# Patient Record
Sex: FEMALE | Race: WHITE | NOT HISPANIC OR LATINO | Employment: PART TIME | URBAN - METROPOLITAN AREA
[De-identification: names, ages, dates, MRNs, and addresses within clinical notes are randomized per-mention and may not be internally consistent; named-entity substitution may affect disease eponyms.]

---
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
## 2023-02-03 ENCOUNTER — TELEPHONE (OUTPATIENT)
Dept: OBSTETRICS AND GYNECOLOGY | Facility: CLINIC | Age: 18
End: 2023-02-03

## 2023-02-03 NOTE — TELEPHONE ENCOUNTER
Called pt back to verbalize to her to try sticking her patch back on and that if her period does not go off or gets heavier along w headaches or feeling light headed to let us know. Pt states she stuck patch on Wednesday and still on period. I expressed to pt that she should give her body time to realize patch is back on but if bleeding does not stop within thew next week to call and let us know. Pt verbalized understanding

## 2023-02-16 ENCOUNTER — TELEPHONE (OUTPATIENT)
Dept: OBSTETRICS AND GYNECOLOGY | Facility: CLINIC | Age: 18
End: 2023-02-16

## 2023-02-16 NOTE — TELEPHONE ENCOUNTER
----- Message from Zeina Juan sent at 2/10/2023  9:54 AM CST -----  Regarding: Patient Case  Contact: Pens[place]olanorm MartinOlman  See Phone Encounter from 2/3.   She is returning call to inform that she is still bleeding.   Ph[facility]acy: [facility] : [phone]

## 2023-02-16 NOTE — TELEPHONE ENCOUNTER
Contacted pt and asked her if she was stilling bleeding. Pt reports she stopped bleeding 2 days ago and is still using Xulane patches. Notified pt that If she would have any more irregular bleeding, SL would discuss with her at follow up appt on 03/08/2023. Pt verbalized understanding.

## 2023-02-27 ENCOUNTER — TELEPHONE (OUTPATIENT)
Dept: OBSTETRICS AND GYNECOLOGY | Facility: CLINIC | Age: 18
End: 2023-02-27
Payer: MEDICAID

## 2023-02-27 DIAGNOSIS — Z30.9 ENCOUNTER FOR CONTRACEPTIVE MANAGEMENT, UNSPECIFIED TYPE: Primary | ICD-10-CM

## 2023-02-27 RX ORDER — NORELGESTROMIN AND ETHINYL ESTRADIOL 150; 35 UG/D; UG/D
PATCH TRANSDERMAL
COMMUNITY
Start: 2023-02-06 | End: 2023-03-08 | Stop reason: SDUPTHER

## 2023-02-27 RX ORDER — LEVONORGESTREL/ETHINYL ESTRADIOL 2.6; 2.3 MG/1; MG/1
1 PATCH TRANSDERMAL
Qty: 3 PATCH | Refills: 0 | COMMUNITY
Start: 2023-02-27 | End: 2023-03-08 | Stop reason: SDDI

## 2023-02-27 NOTE — TELEPHONE ENCOUNTER
----- Message from Adelaida Frye sent at 2/24/2023 11:20 AM CST -----  Regarding: Med Refill  .Type:  RX Refill Request    Who Called:  patient  Refill or New Rx: refill on birth control  Preferred [facility] with phone number: [facility] [place]  Local or Mail Order: local  Ordering Provider: Skyla  Would the patient rather a call back or a response via [facility]?  Call when sent  Best Call Back Number: [phone]  Additional Information: refills to last until next appt on March 8

## 2023-02-27 NOTE — TELEPHONE ENCOUNTER
Contacted pt in regards to refills. Pt is currently taking Xulane but reports she forgot to switch patch a few weeks ago and put patch on late which caused some BTB. Pt states she bled for 17 days straight and tried to put a patch on at some point to stop the bleeding but reports that did not work. Then continued to switch patch as prescribed. [facility] pharmacy told her insurance wouldn't cover refill due to refilling a week too early. Spoke with Sl and per SL we can give her a sample of Twirla for the week until her appt and resend refills at appt on March 8, 2023.

## 2023-03-07 NOTE — PROGRESS NOTES
Chief Complaint:     Chief Complaint   Patient presents with    Contraception     F/u Xulane         HPI:   17 y.o.  presents to follow up on Xulane patches. Pt states they have been working well for her. States she has had some BTB due to forgetting to put patch back on for 2 days.     LMP: 2023, mod flow, denies cramping        Current Outpatient Medications:     XULANE 150-35 mcg/24 hr, SMARTSIG:Topical, Disp: , Rfl:     Review of patient's allergies indicates:  No Known Allergies    Social History     Tobacco Use    Smoking status: Never    Smokeless tobacco: Never   Substance Use Topics    Alcohol use: Never    Drug use: Never       Review of Systems   Constitutional:  Negative for appetite change, chills, fatigue, fever and unexpected weight change.   Respiratory:  Negative for cough, shortness of breath and wheezing.    Cardiovascular:  Negative for chest pain, palpitations and leg swelling.   Gastrointestinal:  Negative for abdominal pain, blood in stool, constipation, diarrhea, nausea, vomiting and reflux.   Endocrine: Negative for diabetes, hair loss, hot flashes, hyperthyroidism and hypothyroidism.   Genitourinary:  Negative for bladder incontinence, decreased libido, dysmenorrhea, dyspareunia, dysuria, flank pain, frequency, genital sores, hematuria, hot flashes, menorrhagia, menstrual problem, pelvic pain, urgency, vaginal bleeding, vaginal discharge, vaginal pain, urinary incontinence, postcoital bleeding, postmenopausal bleeding, vaginal dryness and vaginal odor.   Integumentary:  Negative for rash, acne, hair changes, mole/lesion, breast mass, nipple discharge, breast skin changes and breast tenderness.   Neurological:  Negative for headaches.   Psychiatric/Behavioral:  Negative for depression and sleep disturbance. The patient is not nervous/anxious.    Breast: Negative for lump, mass, mastodynia, nipple discharge, skin changes and tenderness       Physical Exam:   Vitals:    23  "0959   BP: 118/68   Weight: 57.7 kg (127 lb 3.3 oz)   Height: 5' 3.78" (1.62 m)       Body mass index is 21.99 kg/m².    Physical Exam   Constitutional: She is oriented to person, place, and time. She appears well-developed and well-nourished.   Neurological: She is alert and oriented to person, place, and time.   Psychiatric: Mood normal. Her mood appears not anxious. She does not exhibit a depressed mood.     Assessment:     There is no problem list on file for this patient.      Health Maintenance Due   Topic Date Due    Hepatitis C Screening  Never done    Lipid Panel  Never done    COVID-19 Vaccine (1) Never done    HPV Vaccines (1 - 2-dose series) Never done    HIV Screening  Never done    Influenza Vaccine (1) 09/01/2022     Health Maintenance Topics with due status: Not Due       Topic Last Completion Date    TETANUS VACCINE 07/12/2016           Plan:    Barby was seen today for contraception.    Diagnoses and all orders for this visit:    Encounter for surveillance of transdermal contraceptive    - Advised pt to use patch according to script to avoid BTB.     - Pt desires to continue Xulane patch. Refills sent to pharmacy.     RTC 12/2023 for Annual      Breakthrough bleeding with contraceptive patch      Skyla Sierra      "

## 2023-03-08 ENCOUNTER — OFFICE VISIT (OUTPATIENT)
Dept: OBSTETRICS AND GYNECOLOGY | Facility: CLINIC | Age: 18
End: 2023-03-08
Payer: MEDICAID

## 2023-03-08 VITALS
BODY MASS INDEX: 21.71 KG/M2 | DIASTOLIC BLOOD PRESSURE: 68 MMHG | HEIGHT: 64 IN | SYSTOLIC BLOOD PRESSURE: 118 MMHG | WEIGHT: 127.19 LBS

## 2023-03-08 DIAGNOSIS — N92.1 BREAKTHROUGH BLEEDING ON CONTRACEPTIVE PATCH: ICD-10-CM

## 2023-03-08 DIAGNOSIS — Z30.45 ENCOUNTER FOR SURVEILLANCE OF TRANSDERMAL CONTRACEPTIVE: Primary | ICD-10-CM

## 2023-03-08 PROCEDURE — 1160F PR REVIEW ALL MEDS BY PRESCRIBER/CLIN PHARMACIST DOCUMENTED: ICD-10-PCS | Mod: CPTII,,, | Performed by: NURSE PRACTITIONER

## 2023-03-08 PROCEDURE — 3078F PR MOST RECENT DIASTOLIC BLOOD PRESSURE < 80 MM HG: ICD-10-PCS | Mod: CPTII,,, | Performed by: NURSE PRACTITIONER

## 2023-03-08 PROCEDURE — 99212 OFFICE O/P EST SF 10 MIN: CPT | Mod: ,,, | Performed by: NURSE PRACTITIONER

## 2023-03-08 PROCEDURE — 3074F SYST BP LT 130 MM HG: CPT | Mod: CPTII,,, | Performed by: NURSE PRACTITIONER

## 2023-03-08 PROCEDURE — 99212 PR OFFICE/OUTPT VISIT, EST, LEVL II, 10-19 MIN: ICD-10-PCS | Mod: ,,, | Performed by: NURSE PRACTITIONER

## 2023-03-08 PROCEDURE — 3078F DIAST BP <80 MM HG: CPT | Mod: CPTII,,, | Performed by: NURSE PRACTITIONER

## 2023-03-08 PROCEDURE — 1160F RVW MEDS BY RX/DR IN RCRD: CPT | Mod: CPTII,,, | Performed by: NURSE PRACTITIONER

## 2023-03-08 PROCEDURE — 1159F PR MEDICATION LIST DOCUMENTED IN MEDICAL RECORD: ICD-10-PCS | Mod: CPTII,,, | Performed by: NURSE PRACTITIONER

## 2023-03-08 PROCEDURE — 3008F PR BODY MASS INDEX (BMI) DOCUMENTED: ICD-10-PCS | Mod: CPTII,,, | Performed by: NURSE PRACTITIONER

## 2023-03-08 PROCEDURE — 3074F PR MOST RECENT SYSTOLIC BLOOD PRESSURE < 130 MM HG: ICD-10-PCS | Mod: CPTII,,, | Performed by: NURSE PRACTITIONER

## 2023-03-08 PROCEDURE — 1159F MED LIST DOCD IN RCRD: CPT | Mod: CPTII,,, | Performed by: NURSE PRACTITIONER

## 2023-03-08 PROCEDURE — 3008F BODY MASS INDEX DOCD: CPT | Mod: CPTII,,, | Performed by: NURSE PRACTITIONER

## 2023-03-08 RX ORDER — NORELGESTROMIN AND ETHINYL ESTRADIOL 150; 35 UG/D; UG/D
PATCH TRANSDERMAL
Qty: 3 PATCH | Refills: 9 | Status: SHIPPED | OUTPATIENT
Start: 2023-03-08 | End: 2023-12-14 | Stop reason: SDUPTHER

## 2023-04-04 ENCOUNTER — OFFICE VISIT (OUTPATIENT)
Dept: OBSTETRICS AND GYNECOLOGY | Facility: CLINIC | Age: 18
End: 2023-04-04
Payer: MEDICAID

## 2023-04-04 VITALS
TEMPERATURE: 98 F | DIASTOLIC BLOOD PRESSURE: 64 MMHG | WEIGHT: 129.63 LBS | BODY MASS INDEX: 22.13 KG/M2 | SYSTOLIC BLOOD PRESSURE: 110 MMHG | HEIGHT: 64 IN

## 2023-04-04 DIAGNOSIS — N89.8 VAGINAL DISCHARGE: Primary | ICD-10-CM

## 2023-04-04 DIAGNOSIS — N89.8 VAGINAL ITCHING: ICD-10-CM

## 2023-04-04 DIAGNOSIS — Z11.3 SCREEN FOR SEXUALLY TRANSMITTED DISEASES: ICD-10-CM

## 2023-04-04 LAB
BACTERIA HYPHAE, POC: NEGATIVE
GARDNERELLA VAGINALIS: NEGATIVE
OTHER MICROSC. OBSERVATIONS: NEGATIVE
POC BACTERIAL VAGINOSIS: NEGATIVE
POC CLUE CELLS: NEGATIVE
TRICHOMONAS, POC: NEGATIVE
WBC: POSITIVE
YEAST WET PREP: NEGATIVE
YEAST, POC: NEGATIVE

## 2023-04-04 PROCEDURE — 3008F PR BODY MASS INDEX (BMI) DOCUMENTED: ICD-10-PCS | Mod: CPTII,,, | Performed by: NURSE PRACTITIONER

## 2023-04-04 PROCEDURE — 99213 OFFICE O/P EST LOW 20 MIN: CPT | Mod: ,,, | Performed by: NURSE PRACTITIONER

## 2023-04-04 PROCEDURE — 3078F DIAST BP <80 MM HG: CPT | Mod: CPTII,,, | Performed by: NURSE PRACTITIONER

## 2023-04-04 PROCEDURE — 3074F PR MOST RECENT SYSTOLIC BLOOD PRESSURE < 130 MM HG: ICD-10-PCS | Mod: CPTII,,, | Performed by: NURSE PRACTITIONER

## 2023-04-04 PROCEDURE — 1160F PR REVIEW ALL MEDS BY PRESCRIBER/CLIN PHARMACIST DOCUMENTED: ICD-10-PCS | Mod: CPTII,,, | Performed by: NURSE PRACTITIONER

## 2023-04-04 PROCEDURE — 1159F PR MEDICATION LIST DOCUMENTED IN MEDICAL RECORD: ICD-10-PCS | Mod: CPTII,,, | Performed by: NURSE PRACTITIONER

## 2023-04-04 PROCEDURE — 3078F PR MOST RECENT DIASTOLIC BLOOD PRESSURE < 80 MM HG: ICD-10-PCS | Mod: CPTII,,, | Performed by: NURSE PRACTITIONER

## 2023-04-04 PROCEDURE — 1160F RVW MEDS BY RX/DR IN RCRD: CPT | Mod: CPTII,,, | Performed by: NURSE PRACTITIONER

## 2023-04-04 PROCEDURE — 87210 SMEAR WET MOUNT SALINE/INK: CPT | Mod: QW,,, | Performed by: NURSE PRACTITIONER

## 2023-04-04 PROCEDURE — 3074F SYST BP LT 130 MM HG: CPT | Mod: CPTII,,, | Performed by: NURSE PRACTITIONER

## 2023-04-04 PROCEDURE — 3008F BODY MASS INDEX DOCD: CPT | Mod: CPTII,,, | Performed by: NURSE PRACTITIONER

## 2023-04-04 PROCEDURE — 99213 PR OFFICE/OUTPT VISIT, EST, LEVL III, 20-29 MIN: ICD-10-PCS | Mod: ,,, | Performed by: NURSE PRACTITIONER

## 2023-04-04 PROCEDURE — 87220 TISSUE EXAM FOR FUNGI: CPT | Mod: ,,, | Performed by: NURSE PRACTITIONER

## 2023-04-04 PROCEDURE — 87220 POCT KOH: ICD-10-PCS | Mod: ,,, | Performed by: NURSE PRACTITIONER

## 2023-04-04 PROCEDURE — 1159F MED LIST DOCD IN RCRD: CPT | Mod: CPTII,,, | Performed by: NURSE PRACTITIONER

## 2023-04-04 PROCEDURE — 87210 POCT WET PREP: ICD-10-PCS | Mod: QW,,, | Performed by: NURSE PRACTITIONER

## 2023-04-04 RX ORDER — METRONIDAZOLE 500 MG/1
500 TABLET ORAL EVERY 12 HOURS
Qty: 14 TABLET | Refills: 0 | Status: SHIPPED | OUTPATIENT
Start: 2023-04-04 | End: 2023-04-11

## 2023-04-14 ENCOUNTER — TELEPHONE (OUTPATIENT)
Dept: OBSTETRICS AND GYNECOLOGY | Facility: CLINIC | Age: 18
End: 2023-04-14
Payer: MEDICAID

## 2023-06-16 ENCOUNTER — HOSPITAL ENCOUNTER (EMERGENCY)
Facility: HOSPITAL | Age: 18
Discharge: HOME | End: 2023-06-16
Payer: MEDICAID

## 2023-06-16 VITALS
DIASTOLIC BLOOD PRESSURE: 76 MMHG | HEIGHT: 63 IN | WEIGHT: 119 LBS | TEMPERATURE: 98 F | BODY MASS INDEX: 21.09 KG/M2 | RESPIRATION RATE: 17 BRPM | SYSTOLIC BLOOD PRESSURE: 125 MMHG | OXYGEN SATURATION: 100 % | HEART RATE: 93 BPM

## 2023-06-16 DIAGNOSIS — R10.9 ABDOMINAL PAIN, UNSPECIFIED ABDOMINAL LOCATION: Primary | ICD-10-CM

## 2023-06-16 LAB
ALBUMIN SERPL-MCNC: 4.4 G/DL (ref 3.4–5)
ALBUMIN/GLOB SERPL: 1.4 RATIO
ALP SERPL-CCNC: 60 UNIT/L (ref 50–144)
ALT SERPL-CCNC: 17 UNIT/L (ref 1–45)
ANION GAP SERPL CALC-SCNC: 6 MEQ/L (ref 2–13)
AST SERPL-CCNC: 24 UNIT/L (ref 14–36)
B-HCG FREE SERPL-ACNC: <2.39 MIU/ML
BASOPHILS # BLD AUTO: 0.02 X10(3)/MCL (ref 0.01–0.08)
BASOPHILS NFR BLD AUTO: 0.3 % (ref 0.1–1.2)
BILIRUBIN DIRECT+TOT PNL SERPL-MCNC: 0.4 MG/DL (ref 0–1)
BUN SERPL-MCNC: 9 MG/DL (ref 7–20)
CALCIUM SERPL-MCNC: 9.2 MG/DL (ref 8.4–10.2)
CHLORIDE SERPL-SCNC: 106 MMOL/L (ref 98–110)
CO2 SERPL-SCNC: 25 MMOL/L (ref 21–32)
CREAT SERPL-MCNC: 0.75 MG/DL (ref 0.66–1.25)
CREAT/UREA NIT SERPL: 12 (ref 12–20)
EOSINOPHIL # BLD AUTO: 0.07 X10(3)/MCL (ref 0.04–0.36)
EOSINOPHIL NFR BLD AUTO: 1.2 % (ref 0.7–7)
ERYTHROCYTE [DISTWIDTH] IN BLOOD BY AUTOMATED COUNT: 12.4 % (ref 11–14.5)
GFR SERPLBLD CREATININE-BSD FMLA CKD-EPI: >90 MLS/MIN/1.73/M2
GLOBULIN SER-MCNC: 3.2 GM/DL (ref 2–3.9)
GLUCOSE SERPL-MCNC: 134 MG/DL (ref 70–115)
HCT VFR BLD AUTO: 36.2 % (ref 36–48)
HGB BLD-MCNC: 12.4 G/DL (ref 11.8–16)
IMM GRANULOCYTES # BLD AUTO: 0.02 X10(3)/MCL (ref 0–0.03)
IMM GRANULOCYTES NFR BLD AUTO: 0.3 % (ref 0–0.5)
LYMPHOCYTES # BLD AUTO: 0.92 X10(3)/MCL (ref 1.16–3.74)
LYMPHOCYTES NFR BLD AUTO: 15.2 % (ref 20–55)
MCH RBC QN AUTO: 30.2 PG (ref 27–34)
MCHC RBC AUTO-ENTMCNC: 34.3 G/DL (ref 31–37)
MCV RBC AUTO: 88.3 FL (ref 79–99)
MONOCYTES # BLD AUTO: 0.41 X10(3)/MCL (ref 0.24–0.36)
MONOCYTES NFR BLD AUTO: 6.8 % (ref 4.7–12.5)
NEUTROPHILS # BLD AUTO: 4.62 X10(3)/MCL (ref 1.56–6.13)
NEUTROPHILS NFR BLD AUTO: 76.2 % (ref 37–73)
NRBC BLD AUTO-RTO: 0 %
PLATELET # BLD AUTO: 296 X10(3)/MCL (ref 140–371)
PMV BLD AUTO: 9.5 FL (ref 9.4–12.4)
POTASSIUM SERPL-SCNC: 3.5 MMOL/L (ref 3.5–5.1)
PROT SERPL-MCNC: 7.6 GM/DL (ref 6.3–8.2)
RBC # BLD AUTO: 4.1 X10(6)/MCL (ref 4–5.1)
SODIUM SERPL-SCNC: 137 MMOL/L (ref 135–145)
WBC # SPEC AUTO: 6.06 X10(3)/MCL (ref 4–11.5)

## 2023-06-16 PROCEDURE — 25000003 PHARM REV CODE 250: Performed by: NURSE PRACTITIONER

## 2023-06-16 PROCEDURE — 99284 EMERGENCY DEPT VISIT MOD MDM: CPT | Mod: 25

## 2023-06-16 PROCEDURE — 80053 COMPREHEN METABOLIC PANEL: CPT | Performed by: NURSE PRACTITIONER

## 2023-06-16 PROCEDURE — 85025 COMPLETE CBC W/AUTO DIFF WBC: CPT | Performed by: NURSE PRACTITIONER

## 2023-06-16 PROCEDURE — 84702 CHORIONIC GONADOTROPIN TEST: CPT | Performed by: NURSE PRACTITIONER

## 2023-06-16 PROCEDURE — 36415 COLL VENOUS BLD VENIPUNCTURE: CPT | Performed by: NURSE PRACTITIONER

## 2023-06-16 RX ORDER — ACETAMINOPHEN AND CODEINE PHOSPHATE 300; 30 MG/1; MG/1
1 TABLET ORAL
Status: COMPLETED | OUTPATIENT
Start: 2023-06-16 | End: 2023-06-16

## 2023-06-16 RX ADMIN — ACETAMINOPHEN AND CODEINE PHOSPHATE 1 TABLET: 300; 30 TABLET ORAL at 04:06

## 2023-06-16 NOTE — ED PROVIDER NOTES
Encounter Date: 6/16/2023       History     Chief Complaint   Patient presents with    Pelvic Pain    Abdominal Pain     Pt c/o bilateral lower abdominal pain that radiates down to bilateral pelvic area onset 3-4 days with accompanying vaginal bleeding.       Lower abd pain today generalized, no N/V/D, sent from urgent care after exam, negative UPT and UA with bacteria for which she was given antibiotics    Review of patient's allergies indicates:  No Known Allergies  History reviewed. No pertinent past medical history.  History reviewed. No pertinent surgical history.  Family History   Problem Relation Age of Onset    No Known Problems Paternal Grandfather     No Known Problems Paternal Grandmother     Breast cancer Maternal Grandmother     No Known Problems Maternal Grandfather     No Known Problems Father     No Known Problems Mother     No Known Problems Brother     No Known Problems Sister      Social History     Tobacco Use    Smoking status: Never    Smokeless tobacco: Never   Substance Use Topics    Alcohol use: Never    Drug use: Never     Review of Systems   Gastrointestinal:  Positive for abdominal pain.   All other systems reviewed and are negative.    Physical Exam     Initial Vitals [06/16/23 1419]   BP Pulse Resp Temp SpO2   (!) 134/94 91 16 97.5 °F (36.4 °C) 100 %      MAP       --         Physical Exam    Nursing note and vitals reviewed.  Constitutional: She appears well-developed and well-nourished.   HENT:   Head: Normocephalic and atraumatic.   Eyes: EOM are normal. Pupils are equal, round, and reactive to light.   Neck: Neck supple.   Normal range of motion.  Cardiovascular:  Normal rate, regular rhythm and normal heart sounds.           Pulmonary/Chest: Breath sounds normal.   Abdominal: Abdomen is soft. Bowel sounds are normal.   Musculoskeletal:         General: Normal range of motion.      Cervical back: Normal range of motion and neck supple.     Neurological: She is alert and oriented to  person, place, and time.   Skin: Skin is warm and dry. Capillary refill takes less than 2 seconds.   Psychiatric: She has a normal mood and affect. Her behavior is normal. Judgment and thought content normal.       ED Course   Procedures  Labs Reviewed   COMPREHENSIVE METABOLIC PANEL - Abnormal; Notable for the following components:       Result Value    Glucose Level 134 (*)     All other components within normal limits   CBC WITH DIFFERENTIAL - Abnormal; Notable for the following components:    Neut % 76.2 (*)     Lymph % 15.2 (*)     Lymph # 0.92 (*)     Mono # 0.41 (*)     All other components within normal limits   CBC W/ AUTO DIFFERENTIAL    Narrative:     The following orders were created for panel order CBC auto differential.  Procedure                               Abnormality         Status                     ---------                               -----------         ------                     CBC with Differential[[id number]]        Abnormal            Final result                 Please view results for these tests on the individual orders.   HCG, QUANTITATIVE    Narrative:     Beta-HCG ref range weeks after implantation (mIU/mL):   3-4wks 9-130   4-5wks    5-6wks 850-05295   6-7wks 4500-109386   7-12wks [id number]   12-16wks [id number]   16-28wks 1400-53907   20-41wks [phone]          Imaging Results              CT Abdomen Pelvis  Without Contrast (Final result)  Result time 06/16/23 15:59:16      Final result by Ingrid Burt III, MD (06/16/23 15:59:16)                   Impression:      1. A small amount of free fluid is noted within the pelvis.      Electronically signed by: Ingrid Burt  Date:    06/16/2023  Time:    15:59               Narrative:    EXAMINATION:  CT ABDOMEN PELVIS WITHOUT CONTRAST    CLINICAL HISTORY AND TECHNIQUE:  Vonnie Helms RT on 6/16/2023  3:38 PM    PT STATUS: ER    PROCEDURE: CT ABD/PEL W/O    CLINICAL HX : BLADIMIR LOWER ABD PAIN, DOWN TO PELVIC AREA X 3-4 DAYS,  WITH VAGINAL BLEEDING    PMH: N/A    IV CONTRAST: NONE    ORAL CONTRAST: NONE    RECTAL CONTRAST: NONE    AXIAL IMAGES @ 5MM INTERVALS WITH MULTIPLANAR RECONSTRUCTION    TOTAL IMAGE NUMBER: 146    NUMBER OF CT SCANS IN PAST 12 MONTHS: 1    CTDIvol(mGy): HEAD:     BODY: 5.30    DLP(mGycm): HEAD:     BODY: 290.60    TECH: DB    PT TRANSPORTED W/O INCIDENT    This patient has had 1 CT and nuclear medicine scans performed within the last 12 months.    The following DOSE REDUCTION TECHNIQUES are used for all CT scans at [facility]:    1. Automated exposure control.  2. Adjustment of the mA and/or kv according to patient size.  3. Use of iterative reconstruction technique.    COMPARISON:  None    FINDINGS:  Liver: No clinically significant abnormalities are noted.    Gallbladder/biliary system: No clinically significant abnormalities are noted.    Spleen: No clinically significant abnormalities are noted.    Adrenal glands: No clinically significant abnormalities are noted.    Pancreas: No clinically significant abnormalities are noted.    Kidneys/ureters: No clinically significant abnormalities are noted.    Urinary bladder: No clinically significant abnormalities are noted.    Uterus and ovaries: A small amount of free fluid is noted within the pelvis.  The uterus and ovaries appear anatomically unremarkable on limited visualization.    GI tract: Unopacified loops of large and small bowel as well as the gastric lumen and appendix are difficult to evaluate with no definite abnormalities appreciated.    Vascular structures: No clinically significant abnormalities are noted.    Musculoskeletal structures: No clinically significant abnormalities are noted.    Miscellaneous: N/A                                       Medications   acetaminophen-codeine 300-30mg per tablet 1 tablet (1 tablet Oral Given 6/16/23 1620)                              Clinical Impression:   Final diagnoses:  [R10.9] Abdominal  pain, unspecified abdominal location (Primary)        ED Disposition Condition    Discharge Stable          ED Prescriptions    None       Follow-up Information       Follow up With Specialties Details Why Contact Info    gastroenterology   As needed              SNEHA Laguerre  06/16/23 1469

## 2023-06-27 ENCOUNTER — OFFICE VISIT (OUTPATIENT)
Dept: OBSTETRICS AND GYNECOLOGY | Facility: CLINIC | Age: 18
End: 2023-06-27
Payer: MEDICAID

## 2023-06-27 VITALS
DIASTOLIC BLOOD PRESSURE: 82 MMHG | SYSTOLIC BLOOD PRESSURE: 148 MMHG | WEIGHT: 119.5 LBS | TEMPERATURE: 98 F | BODY MASS INDEX: 21.17 KG/M2 | HEIGHT: 63 IN

## 2023-06-27 DIAGNOSIS — R10.2 PELVIC PAIN: ICD-10-CM

## 2023-06-27 DIAGNOSIS — N92.1 BREAKTHROUGH BLEEDING ON CONTRACEPTIVE PATCH: Primary | ICD-10-CM

## 2023-06-27 DIAGNOSIS — Z30.45 CONTRACEPTIVE PATCH STATUS: ICD-10-CM

## 2023-06-27 PROCEDURE — 99213 OFFICE O/P EST LOW 20 MIN: CPT | Mod: ,,, | Performed by: NURSE PRACTITIONER

## 2023-06-27 PROCEDURE — 1160F RVW MEDS BY RX/DR IN RCRD: CPT | Mod: CPTII,,, | Performed by: NURSE PRACTITIONER

## 2023-06-27 PROCEDURE — 1159F PR MEDICATION LIST DOCUMENTED IN MEDICAL RECORD: ICD-10-PCS | Mod: CPTII,,, | Performed by: NURSE PRACTITIONER

## 2023-06-27 PROCEDURE — 1159F MED LIST DOCD IN RCRD: CPT | Mod: CPTII,,, | Performed by: NURSE PRACTITIONER

## 2023-06-27 PROCEDURE — 3008F PR BODY MASS INDEX (BMI) DOCUMENTED: ICD-10-PCS | Mod: CPTII,,, | Performed by: NURSE PRACTITIONER

## 2023-06-27 PROCEDURE — 1160F PR REVIEW ALL MEDS BY PRESCRIBER/CLIN PHARMACIST DOCUMENTED: ICD-10-PCS | Mod: CPTII,,, | Performed by: NURSE PRACTITIONER

## 2023-06-27 PROCEDURE — 3077F SYST BP >= 140 MM HG: CPT | Mod: CPTII,,, | Performed by: NURSE PRACTITIONER

## 2023-06-27 PROCEDURE — 3079F DIAST BP 80-89 MM HG: CPT | Mod: CPTII,,, | Performed by: NURSE PRACTITIONER

## 2023-06-27 PROCEDURE — 3079F PR MOST RECENT DIASTOLIC BLOOD PRESSURE 80-89 MM HG: ICD-10-PCS | Mod: CPTII,,, | Performed by: NURSE PRACTITIONER

## 2023-06-27 PROCEDURE — 3008F BODY MASS INDEX DOCD: CPT | Mod: CPTII,,, | Performed by: NURSE PRACTITIONER

## 2023-06-27 PROCEDURE — 99213 PR OFFICE/OUTPT VISIT, EST, LEVL III, 20-29 MIN: ICD-10-PCS | Mod: ,,, | Performed by: NURSE PRACTITIONER

## 2023-06-27 PROCEDURE — 3077F PR MOST RECENT SYSTOLIC BLOOD PRESSURE >= 140 MM HG: ICD-10-PCS | Mod: CPTII,,, | Performed by: NURSE PRACTITIONER

## 2023-06-27 NOTE — PROGRESS NOTES
Chief Complaint:     Chief Complaint   Patient presents with    f/u for abdominal pain in E/R      2023         HPI:   17 y.o.  presents to follow up after ER visit for lower abdominal pain.  CT done in ER.    Had bleeding when in ER with xulane patch on.  Reports BTB since 2023, no cramping at this time.  Was having severe cramping when seen at ER.    CT Scan on 2023:     FINDINGS:  Liver: No clinically significant abnormalities are noted.     Gallbladder/biliary system: No clinically significant abnormalities are noted.     Spleen: No clinically significant abnormalities are noted.     Adrenal glands: No clinically significant abnormalities are noted.     Pancreas: No clinically significant abnormalities are noted.     Kidneys/ureters: No clinically significant abnormalities are noted.     Urinary bladder: No clinically significant abnormalities are noted.     Uterus and ovaries: A small amount of free fluid is noted within the pelvis.  The uterus and ovaries appear anatomically unremarkable on limited visualization.     GI tract: Unopacified loops of large and small bowel as well as the gastric lumen and appendix are difficult to evaluate with no definite abnormalities appreciated.     Vascular structures: No clinically significant abnormalities are noted.     Musculoskeletal structures: No clinically significant abnormalities are noted.     Miscellaneous: N/A    LMP: 2023  Frequency: q month     Cycle Length: 7 days   Flow: normal  Intermenstrual Bleeding: No  Postcoital Bleeding: No  Dysmenorrhea: No  Sexually Active: Yes   Dyspareunia: No  Contraception: Xulane patches  H/o STI: No   Last pap: n/a  H/o Abnormal Pap: n/a    Gardasil: 0/3   MMG: n/a          Current Outpatient Medications:     XULANE 150-35 mcg/24 hr, Apply weekly x 3 weeks then patch free x 1 week, Disp: 3 patch, Rfl: 9    Review of patient's allergies indicates:  No Known Allergies    Social History     Tobacco  "Use    Smoking status: Never    Smokeless tobacco: Never   Substance Use Topics    Alcohol use: Never    Drug use: Never       Review of Systems   Constitutional:  Negative for appetite change, chills, fatigue, fever and unexpected weight change.   Gastrointestinal:  Negative for abdominal pain, blood in stool, constipation, diarrhea, nausea, vomiting and reflux.   Genitourinary:  Negative for bladder incontinence, decreased libido, dysmenorrhea, dyspareunia, dysuria, flank pain, frequency, genital sores, hematuria, hot flashes, menorrhagia, menstrual problem, pelvic pain, urgency, vaginal bleeding (breakthrough bleeding), vaginal discharge, vaginal pain, urinary incontinence, postcoital bleeding, postmenopausal bleeding, vaginal dryness and vaginal odor.   Integumentary:  Negative for rash, acne, hair changes and mole/lesion.   Neurological:  Negative for headaches.        Physical Exam:   Vitals:    06/27/23 0856   BP: (!) 148/82   BP Location: Right arm   Temp: 97.5 °F (36.4 °C)   Weight: 54.2 kg (119 lb 8 oz)   Height: 5' 3" (1.6 m)       Body mass index is 21.17 kg/m².    Physical Exam  Constitutional:       Appearance: She is well-developed.   Abdominal:      General: Abdomen is flat.   Neurological:      Mental Status: She is alert and oriented to person, place, and time.   Psychiatric:         Attention and Perception: Attention normal.         Mood and Affect: Mood normal. Mood is not anxious or depressed.   Vagina - bloody d/c noted      Assessment:     Patient Active Problem List   Diagnosis    Abdominal pain       Health Maintenance Due   Topic Date Due    Hepatitis C Screening  Never done    Lipid Panel  Never done    COVID-19 Vaccine (1) Never done    HPV Vaccines (1 - 2-dose series) Never done    HIV Screening  Never done    Chlamydia Screening  Never done     Health Maintenance Topics with due status: Not Due       Topic Last Completion Date    Influenza Vaccine 10/30/2014    TETANUS VACCINE " 07/12/2016           Plan:    Barby was seen today for f/u for abdominal pain in e/r .    Diagnoses and all orders for this visit:    Pelvic pain  - Educated    - NSAIDs, heating pad, warm bath    - Pain precautions   Breakthrough bleeding on contraceptive patch  One swab for leuk/myco/urea    Continue xulane patch and RTC prn

## 2023-07-05 ENCOUNTER — TELEPHONE (OUTPATIENT)
Dept: OBSTETRICS AND GYNECOLOGY | Facility: CLINIC | Age: 18
End: 2023-07-05
Payer: MEDICAID

## 2023-07-05 NOTE — TELEPHONE ENCOUNTER
----- Message from Skyla Sierra NP sent at 7/5/2023  8:46 AM CDT -----  + chlamydia, please notify patient, she and partner should be treated with Doxycycline 100 mg po bid x 7 days unless pregnant, NORM 4 weeks

## 2023-07-05 NOTE — PROGRESS NOTES
+ chlamydia, please notify patient, she and partner should be treated with Doxycycline 100 mg po bid x 7 days unless pregnant, NORM 4 weeks

## 2023-08-07 NOTE — PROGRESS NOTES
Chief Complaint:     Chief Complaint   Patient presents with    NORM CZ      +CZ 23 Tx'd 23 Partner treated          HPI:   18 y.o.  female   presents for NORM on +CZ on 2023. Pt was treated on 2023 with doxy 100mg.     LMP: 23  Frequency: q month     Cycle Length: 7 days   Flow: normal  Intermenstrual Bleeding: No  Postcoital Bleeding: No  Dysmenorrhea: No  Sexually Active: Yes   Dyspareunia: No  Contraception: Xulane patches  H/o STI: No   Last pap: n/a  H/o Abnormal Pap: n/a    Gardasil: 0/3   MMG: n/a  H/o abnl mmg: n/a  Colonoscopy: n/a        Current Outpatient Medications:     XULANE 150-35 mcg/24 hr, Apply weekly x 3 weeks then patch free x 1 week, Disp: 3 patch, Rfl: 9    Review of patient's allergies indicates:  No Known Allergies    Social History     Tobacco Use    Smoking status: Never    Smokeless tobacco: Never   Substance Use Topics    Alcohol use: Never    Drug use: Never       Review of Systems:   General/Constitutional: Chills denies. Fatigue/weakness denies. Fever denies. Night sweats denies. Hot flashes denies    Respiratory: Cough denies. Hemoptysis denies. SOB denies. Sputum production denies. Wheezing denies .   Cardiovascular: Chest pain denies . Dizziness denies. Palpitations denies. Swelling in hands/feet denies    Gastrointestinal: Abdominal pain denies. Blood in stool denies. Constipation denies. Diarrhea denies. Heartburn denies. Nausea denies. Vomiting denies    Genitourinary: Incontinence denies. Blood in urine denies. Frequent urination denies. Painful urination denies. Urinary urgency denies. Nocturia denies    Gynecologic: Irregular menses denies. Heavy bleeding denies. Painful menses denies. Vaginal discharge denies. Vaginal odor denies. Vaginal itching denies. Vaginal lesion denies. Pelvic pain denies. Decreased libido denies. Vulvar lesion denies. Prolapse of genital organs denies. Painful intercourse denies. Postcoital bleeding denies   "  Psychiatric: Depression denies. Anxiety denies       Physical Exam:   Vitals:    08/11/23 0953   BP: 122/64   Temp: 98.4 °F (36.9 °C)   Weight: 55.8 kg (123 lb 0.3 oz)   Height: 5' 3" (1.6 m)       Body mass index is 21.79 kg/m².    Physical Exam  Constitutional:       Appearance: She is well-developed.   Abdominal:      General: Abdomen is flat. Bowel sounds are normal. There is no distension.      Palpations: Abdomen is soft. There is no mass.      Tenderness: There is no abdominal tenderness.      Hernia: No hernia is present.   Genitourinary:     Vagina: No vaginal discharge, erythema, tenderness or bleeding.      Cervix: No cervical motion tenderness or discharge.   Neurological:      Mental Status: She is alert and oriented to person, place, and time.   Psychiatric:         Attention and Perception: Attention normal.         Mood and Affect: Mood normal.             Assessment:     Patient Active Problem List   Diagnosis    Abdominal pain       Health Maintenance Due   Topic Date Due    Hepatitis C Screening  Never done    Lipid Panel  Never done    COVID-19 Vaccine (1) Never done    HPV Vaccines (1 - 2-dose series) Never done    HIV Screening  Never done    Chlamydia Screening  Never done     Health Maintenance Topics with due status: Not Due       Topic Last Completion Date    Influenza Vaccine 10/30/2014    TETANUS VACCINE 07/12/2016           Plan:    Barby was seen today for norm cz .    Diagnoses and all orders for this visit:    Chlamydia      Tested +: 06/27/2023 + CZ    Treated: 07/05/2023 Treated with Doxy 100mg BID x7 days    NORM today: One Swab GC/CZ/TV         "

## 2023-08-11 ENCOUNTER — OFFICE VISIT (OUTPATIENT)
Dept: OBSTETRICS AND GYNECOLOGY | Facility: CLINIC | Age: 18
End: 2023-08-11
Payer: MEDICAID

## 2023-08-11 VITALS
TEMPERATURE: 98 F | WEIGHT: 123 LBS | HEIGHT: 63 IN | DIASTOLIC BLOOD PRESSURE: 64 MMHG | BODY MASS INDEX: 21.79 KG/M2 | SYSTOLIC BLOOD PRESSURE: 122 MMHG

## 2023-08-11 DIAGNOSIS — A74.9 CHLAMYDIA: Primary | ICD-10-CM

## 2023-08-11 PROCEDURE — 3078F DIAST BP <80 MM HG: CPT | Mod: CPTII,,, | Performed by: NURSE PRACTITIONER

## 2023-08-11 PROCEDURE — 99213 OFFICE O/P EST LOW 20 MIN: CPT | Mod: ,,, | Performed by: NURSE PRACTITIONER

## 2023-08-11 PROCEDURE — 3008F PR BODY MASS INDEX (BMI) DOCUMENTED: ICD-10-PCS | Mod: CPTII,,, | Performed by: NURSE PRACTITIONER

## 2023-08-11 PROCEDURE — 1160F PR REVIEW ALL MEDS BY PRESCRIBER/CLIN PHARMACIST DOCUMENTED: ICD-10-PCS | Mod: CPTII,,, | Performed by: NURSE PRACTITIONER

## 2023-08-11 PROCEDURE — 99213 PR OFFICE/OUTPT VISIT, EST, LEVL III, 20-29 MIN: ICD-10-PCS | Mod: ,,, | Performed by: NURSE PRACTITIONER

## 2023-08-11 PROCEDURE — 1160F RVW MEDS BY RX/DR IN RCRD: CPT | Mod: CPTII,,, | Performed by: NURSE PRACTITIONER

## 2023-08-11 PROCEDURE — 1159F PR MEDICATION LIST DOCUMENTED IN MEDICAL RECORD: ICD-10-PCS | Mod: CPTII,,, | Performed by: NURSE PRACTITIONER

## 2023-08-11 PROCEDURE — 3008F BODY MASS INDEX DOCD: CPT | Mod: CPTII,,, | Performed by: NURSE PRACTITIONER

## 2023-08-11 PROCEDURE — 1159F MED LIST DOCD IN RCRD: CPT | Mod: CPTII,,, | Performed by: NURSE PRACTITIONER

## 2023-08-11 PROCEDURE — 3074F SYST BP LT 130 MM HG: CPT | Mod: CPTII,,, | Performed by: NURSE PRACTITIONER

## 2023-08-11 PROCEDURE — 3074F PR MOST RECENT SYSTOLIC BLOOD PRESSURE < 130 MM HG: ICD-10-PCS | Mod: CPTII,,, | Performed by: NURSE PRACTITIONER

## 2023-08-11 PROCEDURE — 3078F PR MOST RECENT DIASTOLIC BLOOD PRESSURE < 80 MM HG: ICD-10-PCS | Mod: CPTII,,, | Performed by: NURSE PRACTITIONER

## 2023-10-16 ENCOUNTER — TELEPHONE (OUTPATIENT)
Dept: OBSTETRICS AND GYNECOLOGY | Facility: CLINIC | Age: 18
End: 2023-10-16
Payer: MEDICAID

## 2023-12-13 NOTE — PROGRESS NOTES
Chief Complaint:   Well Woman     History of Present Illness:  Barby Thurman is a 18 y.o. year old  presents for her well woman exam. Currently relying on Xulane for birth control. Patient has regular monthly cycles lasting 7 days with normal flow. Painless. Denies any irregular menstrual bleeding. She is without complaints.      LMP: 2023  Frequency: q month     Cycle Length: 7 days  Flow: normal  Intermenstrual bleeding: No  Postcoital bleeding: No  Dysmenorrhea: No  Sexually active: Yes   Dyspareunia: No  Contraception: Xulane patches  H/o STI: CZ  Last pap: na  H/o abnl pap: n/a  Gardasil: 0/3   MMG: n/a  H/o abnl MMG: n/a  Colonoscopy: n/a      Review of Systems:  General/Constitutional: Chills denies. Fatigue/weakness denies. Fever denies. Night sweats denies. Hot flashes denies    Respiratory: Cough denies. Hemoptysis denies. SOB denies. Sputum production denies. Wheezing denies .   Cardiovascular: Chest pain denies. Dizziness denies. Palpitations denies. Swelling in hands/feet denies.                Breast: Dimpling denies. Breast mass denies. Breast pain/tenderness denies. Nipple discharge denies. Puckering denies.  Gastrointestinal: Abdominal pain denies. Blood in stool denies. Constipation denies. Diarrhea denies. Heartburn denies. Nausea denies. Vomiting denies    Genitourinary: Incontinence denies. Blood in urine denies. Frequent urination denies. Painful urination denies. Urinary urgency denies. Nocturia denies    Gynecologic: Irregular menses denies. Heavy bleeding denies. Painful menses denies. Vaginal discharge denies. Vaginal odor denies. Vaginal itching denies. Vaginal lesion denies. Pelvic pain denies. Decreased libido denies. Vulvar lesion denies. Prolapse of genital organs denies. Painful intercourse denies. Postcoital bleeding denies    Psychiatric: Depression denies. Anxiety denies.     OB History    Para Term  AB Living   0 0 0 0 0 0   SAB IAB Ectopic Multiple  "Live Births   0 0 0 0 0      The patient has never been pregnant.    History reviewed. No pertinent past medical history.    Current Outpatient Medications:     XULANE 150-35 mcg/24 hr, Apply weekly x 3 weeks then patch free x 1 week, Disp: 3 patch, Rfl: 9    Review of patient's allergies indicates:  No Known Allergies    History reviewed. No pertinent surgical history.  Family History   Problem Relation Age of Onset    Breast cancer Maternal Grandmother     Colon cancer Neg Hx     Ovarian cancer Neg Hx     Uterine cancer Neg Hx     Cervical cancer Neg Hx      Social History     Socioeconomic History    Marital status: Single   Tobacco Use    Smoking status: Never     Passive exposure: Current    Smokeless tobacco: Never   Substance and Sexual Activity    Alcohol use: Never    Drug use: Never    Sexual activity: Yes     Partners: Male     Birth control/protection: Patch     Comment: STI: CZ       Physical Exam:  /60 (BP Location: Left arm, Patient Position: Sitting, BP Method: Medium (Manual))   Temp 97.9 °F (36.6 °C) (Temporal)   Ht 5' 4" (1.626 m)   Wt 57.2 kg (126 lb)   LMP 11/27/2023 (Exact Date)   BMI 21.63 kg/m²     General appearance: healthy, well-nourished and well-developed     Psychiatric: Orientation to time, place and person. Normal mood and affect and active, alert     Skin: Appearance: no rashes or lesions.     Neck:   Neck: supple, FROM, trachea midline. and no masses   Thyroid: no enlargement or nodules and non-tender.     Cardiovascular:  Auscultation: RRR and no murmur.   Peripheral Vascular: no varicosities, LLE edema, RLE edema, calf tenderness, and palpable cord and pedal pulses intact.     Lungs:   Respiratory effort: no intercostal retractions or accessory muscle usage.   Auscultation: no wheezing, rales/crackles, or rhonchi and clear to auscultation.     Breast: deferred.     Abdomen:   Auscultation/Inspection/Palpation: no hepatomegaly, splenomegaly, masses, tenderness or CVA " tenderness and soft, non-distended bowel sounds preset.    Hernia: no palpable hernias.     Female Genitalia: deferred    Lymph Nodes: Palpation: non-tender submandibular nodes, axillary nodes       Assessment/Plan:  1. Normal gynecologic examination  gc/cz/tv on urine  Healthy diet, exercise  Multivitamin  Seat belt  Sunscreen use  Safe sex education  Contraception education: xulane patch  STI education   Gardasil evaluation: 0/3    2. Encounter for surveillance of transdermal contraceptive  Explained common options for contraception including natural family planning, barrier methods, depo-provera, ocps,  patch, nuvaring, IUD, Nexplanon, and sterilization.        Discussed that patch should be changed every week to minimize breakthrough bleeding and to expect breakthrough bleeding for the first 3 months and then it should resolve. If BTB continues after 3 months, a change may be necessary.        Advised patient that smoking is harmful due to increased risks of stroke, heart attack and blood clots when taking pills. Patient to contact us immediately if she experiences severe abdominal pain, severe chest pain, severe headaches, eye-visual changes, severe leg pain or SOB.       Discussed that birth control do not protect against STDs.      Refills of Xulane sent to pharmacy

## 2023-12-14 ENCOUNTER — OFFICE VISIT (OUTPATIENT)
Dept: OBSTETRICS AND GYNECOLOGY | Facility: CLINIC | Age: 18
End: 2023-12-14
Payer: MEDICAID

## 2023-12-14 VITALS
HEIGHT: 64 IN | TEMPERATURE: 98 F | DIASTOLIC BLOOD PRESSURE: 60 MMHG | WEIGHT: 126 LBS | SYSTOLIC BLOOD PRESSURE: 120 MMHG | BODY MASS INDEX: 21.51 KG/M2

## 2023-12-14 DIAGNOSIS — Z01.419 NORMAL GYNECOLOGIC EXAMINATION: Primary | ICD-10-CM

## 2023-12-14 DIAGNOSIS — Z30.45 ENCOUNTER FOR SURVEILLANCE OF TRANSDERMAL CONTRACEPTIVE: ICD-10-CM

## 2023-12-14 PROCEDURE — 99395 PREV VISIT EST AGE 18-39: CPT | Mod: ,,, | Performed by: NURSE PRACTITIONER

## 2023-12-14 PROCEDURE — 99395 PR PREVENTIVE VISIT,EST,18-39: ICD-10-PCS | Mod: ,,, | Performed by: NURSE PRACTITIONER

## 2023-12-14 PROCEDURE — 3074F PR MOST RECENT SYSTOLIC BLOOD PRESSURE < 130 MM HG: ICD-10-PCS | Mod: CPTII,,, | Performed by: NURSE PRACTITIONER

## 2023-12-14 PROCEDURE — 3008F PR BODY MASS INDEX (BMI) DOCUMENTED: ICD-10-PCS | Mod: CPTII,,, | Performed by: NURSE PRACTITIONER

## 2023-12-14 PROCEDURE — 3008F BODY MASS INDEX DOCD: CPT | Mod: CPTII,,, | Performed by: NURSE PRACTITIONER

## 2023-12-14 PROCEDURE — 1159F PR MEDICATION LIST DOCUMENTED IN MEDICAL RECORD: ICD-10-PCS | Mod: CPTII,,, | Performed by: NURSE PRACTITIONER

## 2023-12-14 PROCEDURE — 87491 CHLMYD TRACH DNA AMP PROBE: CPT | Performed by: NURSE PRACTITIONER

## 2023-12-14 PROCEDURE — 1160F PR REVIEW ALL MEDS BY PRESCRIBER/CLIN PHARMACIST DOCUMENTED: ICD-10-PCS | Mod: CPTII,,, | Performed by: NURSE PRACTITIONER

## 2023-12-14 PROCEDURE — 3074F SYST BP LT 130 MM HG: CPT | Mod: CPTII,,, | Performed by: NURSE PRACTITIONER

## 2023-12-14 PROCEDURE — 3078F PR MOST RECENT DIASTOLIC BLOOD PRESSURE < 80 MM HG: ICD-10-PCS | Mod: CPTII,,, | Performed by: NURSE PRACTITIONER

## 2023-12-14 PROCEDURE — 1160F RVW MEDS BY RX/DR IN RCRD: CPT | Mod: CPTII,,, | Performed by: NURSE PRACTITIONER

## 2023-12-14 PROCEDURE — 87591 N.GONORRHOEAE DNA AMP PROB: CPT | Performed by: NURSE PRACTITIONER

## 2023-12-14 PROCEDURE — 1159F MED LIST DOCD IN RCRD: CPT | Mod: CPTII,,, | Performed by: NURSE PRACTITIONER

## 2023-12-14 PROCEDURE — 3078F DIAST BP <80 MM HG: CPT | Mod: CPTII,,, | Performed by: NURSE PRACTITIONER

## 2023-12-14 PROCEDURE — 87661 TRICHOMONAS VAGINALIS AMPLIF: CPT | Performed by: NURSE PRACTITIONER

## 2023-12-14 RX ORDER — NORELGESTROMIN AND ETHINYL ESTRADIOL 150; 35 UG/D; UG/D
PATCH TRANSDERMAL
Qty: 3 PATCH | Refills: 11 | Status: SHIPPED | OUTPATIENT
Start: 2023-12-14

## 2023-12-15 LAB
C TRACH RRNA SPEC QL NAA+PROBE: POSITIVE
N GONORRHOEA RRNA SPEC QL NAA+PROBE: NEGATIVE
SPECIMEN SOURCE: ABNORMAL
SPECIMEN SOURCE: ABNORMAL
SPECIMEN SOURCE: NORMAL
T VAGINALIS RRNA SPEC QL NAA+PROBE: NEGATIVE

## 2023-12-18 DIAGNOSIS — A74.9 CHLAMYDIA INFECTION: Primary | ICD-10-CM

## 2023-12-18 RX ORDER — DOXYCYCLINE 100 MG/1
100 CAPSULE ORAL EVERY 12 HOURS
Qty: 14 CAPSULE | Refills: 0 | Status: SHIPPED | OUTPATIENT
Start: 2023-12-18 | End: 2023-12-25

## 2023-12-18 NOTE — PROGRESS NOTES
Patient notified of +Chlamydia results from 23.  Gc/Tv is Negative.  Counseled patient on infection.  Doxycycline 100 mg bid x 7 days ordered per MIAN Sierra NP and sent to pharmacy .  Instructions on medication usage given.  Partner to be treated.  No intercourse until after NORM.  NORM scheduled for 4 weeks.  Patient desires partner to be treated.  Partners information as follow: JORDAN Bates 2003, PARTHA, chato -Lee.  Medication called in to pharmacy per MIAN Sierra NP.     STD Report Form completed and faxed to VICKI ALVAREZ

## 2024-01-11 NOTE — PROGRESS NOTES
"Chief Complaint:  NORM  (NORM +CZ 23 Tx'd 23 Partner tx'd )    History of Present Illness:  Barby Thurman is a 18 y.o.  who presents for a test of cure due to a previous diagnosis of chlamydia. She completed her antibiotics with no problems. She denies vaginal itching, odor, or discharge. That partner also completed the medication for the infection.       Review of Systems:  General/Constitutional: Chills denies. Fatigue/weakness denies. Fever denies. Night sweats denies. Hot flashes denies  Gastrointestinal: Abdominal pain denies. Blood in stool denies. Constipation denies. Diarrhea denies. Heartburn denies. Nausea denies. Vomiting denies   Genitourinary: Incontinence denies. Blood in urine denies. Frequent urination denies. Urgency denies. Painful urination denies. Nocturia denies   Gynecologic: Irregular menses denies. Heavy bleeding denies. Painful menses denies. Vaginal discharge denies. Vaginal odor denies. Vaginal itching/Irritation denies. Vaginal lesion denies.  Pelvic pain denies. Decreased libido denies. Vulvar lesion denies. Prolapse of genital organs denies. Painful intercourse denies. Postcoital bleeding denies   Psychiatric: Mood lability denies. Depressed mood denies. Suicidal thoughts denies. Anxiety denies. Overwhelmed denies. Appetite normal. Energy level normal     OB History    Para Term  AB Living   0 0 0 0 0 0   SAB IAB Ectopic Multiple Live Births   0 0 0 0 0      The patient has never been pregnant.    History reviewed. No pertinent past medical history.    Current Outpatient Medications:     XULANE 150-35 mcg/24 hr, Apply weekly x 3 weeks then patch free x 1 week, Disp: 3 patch, Rfl: 11      Physical Exam:  /62 (BP Location: Left arm)   Temp 97.2 °F (36.2 °C)   Ht 5' 4" (1.626 m)   Wt 59.1 kg (130 lb 6.4 oz)   LMP 2023   BMI 22.38 kg/m²     Chaperone present.       Constitutional: General appearance: healthy, well-nourished and " well-developed   Psychiatric: Orientation to time, place and person. Normal mood and affect and active, alert   Abdomen: Auscultation/Inspection/Palpation: No tenderness or masses. Soft, nondistended    Female Genitalia:      Vulva: no masses, atrophy or lesions      Bladder/Urethra: no urethral discharge or mass, normal meatus, bladder non-distended.      Vagina: no tenderness, erythema, cystocele, rectocele, abnormal vaginal discharge, or vesicle(s) or ulcers                   Cervix: no discharge or cervical motion tenderness and grossly normal      Uterus: normal size and shape and midline, non-tender, and no uterine prolapse.      Adnexa/Parametria: no parametrial tenderness or mass, no adnexal tenderness or ovarian mass.       Assessment/Plan:  1. Chlamydia infection  Aptima CT/GC     Educated  Safe sex education    RTC Annual/PRN     Counseled on safe sex practices including barrier methods such as condoms to protect against STIs.

## 2024-01-17 ENCOUNTER — OFFICE VISIT (OUTPATIENT)
Dept: OBSTETRICS AND GYNECOLOGY | Facility: CLINIC | Age: 19
End: 2024-01-17
Payer: MEDICAID

## 2024-01-17 VITALS
SYSTOLIC BLOOD PRESSURE: 114 MMHG | TEMPERATURE: 97 F | DIASTOLIC BLOOD PRESSURE: 62 MMHG | WEIGHT: 130.38 LBS | BODY MASS INDEX: 22.26 KG/M2 | HEIGHT: 64 IN

## 2024-01-17 DIAGNOSIS — A74.9 CHLAMYDIA INFECTION: Primary | ICD-10-CM

## 2024-01-17 PROCEDURE — 1160F RVW MEDS BY RX/DR IN RCRD: CPT | Mod: CPTII,,, | Performed by: NURSE PRACTITIONER

## 2024-01-17 PROCEDURE — 87591 N.GONORRHOEAE DNA AMP PROB: CPT | Performed by: NURSE PRACTITIONER

## 2024-01-17 PROCEDURE — 99213 OFFICE O/P EST LOW 20 MIN: CPT | Mod: ,,, | Performed by: NURSE PRACTITIONER

## 2024-01-17 PROCEDURE — 3078F DIAST BP <80 MM HG: CPT | Mod: CPTII,,, | Performed by: NURSE PRACTITIONER

## 2024-01-17 PROCEDURE — 3074F SYST BP LT 130 MM HG: CPT | Mod: CPTII,,, | Performed by: NURSE PRACTITIONER

## 2024-01-17 PROCEDURE — 1159F MED LIST DOCD IN RCRD: CPT | Mod: CPTII,,, | Performed by: NURSE PRACTITIONER

## 2024-01-17 PROCEDURE — 3008F BODY MASS INDEX DOCD: CPT | Mod: CPTII,,, | Performed by: NURSE PRACTITIONER

## 2024-01-19 LAB
C TRACH RRNA SPEC QL NAA+PROBE: NEGATIVE
N GONORRHOEA RRNA SPEC QL NAA+PROBE: NEGATIVE
SPECIMEN SOURCE: NORMAL
SPECIMEN SOURCE: NORMAL

## 2024-08-08 ENCOUNTER — OFFICE VISIT (OUTPATIENT)
Dept: OBSTETRICS AND GYNECOLOGY | Facility: CLINIC | Age: 19
End: 2024-08-08
Payer: MEDICAID

## 2024-08-08 VITALS
SYSTOLIC BLOOD PRESSURE: 134 MMHG | BODY MASS INDEX: 25.06 KG/M2 | WEIGHT: 136.19 LBS | DIASTOLIC BLOOD PRESSURE: 82 MMHG | HEIGHT: 62 IN

## 2024-08-08 DIAGNOSIS — Z32.01 POSITIVE PREGNANCY TEST: Primary | ICD-10-CM

## 2024-08-08 DIAGNOSIS — N91.5 OLIGOMENORRHEA, UNSPECIFIED TYPE: ICD-10-CM

## 2024-08-08 LAB
B-HCG UR QL: POSITIVE
CTP QC/QA: YES

## 2024-08-12 NOTE — PROGRESS NOTES
Chief Complaint:  Initial Prenatal Visit    History of Present Illness:  Barby Thurman is a 19 y.o. year old  presents for her new ob, 8w1d by LMP.    denies hx of genetic disorders for self, FOB    Gyn History:    Menstrual History  Cycle: Yes  Menarche Age: 12 years  Flow Duration: 7  Flow: Normal  Interval: 28  Intermenstrual Bleeding: No  Dysmenorrhea: Yes  Dysmenorrhea Severity : Mild    Menopause  Menopause Age: 1 years    Pap History  HPV Vaccine Completed: No    [place]  Sexually Active: Yes  Sexual Orientation: heterosexual  Postcoital Bleeding: No  Dyspareunia: No  STI History: Yes  STI Type: Chlamydia  Contraception: No    Breast History  Last Breast Imaging Date: No  History of Breast Biopsy: No          Review of Systems:  General/Constitutional: Chills denies. Fatigue/weakness denies. Fever denies. Night sweats denies. Hot flashes denies.   Respiratory: Cough denies. Hemoptysis denies. SOB denies. Sputum production denies. Wheezing denies.   Cardiovascular: Chest pain denies. Dizziness denies. Palpitations denies. Swelling in hands/feet denies.    Gastrointestinal: Abdominal pain denies. Blood in stool denies. Constipation denies. Diarrhea denies. Heartburn denies. Nausea denies. Vomiting denies.   Genitourinary: Incontinence denies. Blood in urine denies. Frequent urination denies. Painful urination denies.  Urinary urgency denies.  Nocturia denies.   Gynecologic: Irregular menses denies. Heavy bleeding denies. Painful menses denies. Vaginal discharge denies. Vaginal odor denies. Vaginal itching denies. Vaginal lesion denies.  Pelvic pain denies. Decreased libido denies. Vulvar lesion denies. Prolapse of genital organs denies. Painful intercourse denies. Postcoital bleeding denies.   Psychiatric: Depression denies. Anxiety denies.     OB History    Para Term  AB Living   1 0 0 0 0 0   SAB IAB Ectopic Multiple Live Births   0 0 0 0 0      # 1 - Date: None, Sex: None, Weight:  None, GA: None, Type: None, Apgar1: None, Apgar5: None, Living: None, Birth Comments: None      History reviewed. No pertinent past medical history.    Current Outpatient Medications:     ondansetron (ZOFRAN-ODT) 4 MG TbDL, Take 4 mg by mouth every 8 (eight) hours., Disp: , Rfl:     Review of patient's allergies indicates:  No Known Allergies    History reviewed. No pertinent surgical history.  Family History   Problem Relation Name Age of Onset    Breast cancer Maternal Grandmother MGG     Colon cancer Neg Hx      Ovarian cancer Neg Hx      Uterine cancer Neg Hx      Cervical cancer Neg Hx       Social History     Socioeconomic History    Marital status: Single   Tobacco Use    Smoking status: Never     Passive exposure: Current    Smokeless tobacco: Never   Substance and Sexual Activity    Alcohol use: Never    Drug use: Never    Sexual activity: Yes     Partners: Male     Comment: STI: CZ       Physical Exam:  /70   Wt 62.6 kg (138 lb)   LMP 06/16/2024 (Exact Date)   BMI 25.24 kg/m²     Chaperone: present.       General appearance: healthy, well-nourished and well-developed     Psychiatric: Orientation to time, place and person. Normal mood and affect and active, alert     Skin:   Appearance: no rashes or lesions.     Neck:   Neck: supple, FROM, trachea midline. and no masses   Thyroid: no enlargement or nodules and non-tender.       Cardiovascular:   Auscultation: RRR and no murmur.   Peripheral Vascular: no varicosities, LLE edema, RLE edema, calf tenderness, and palpable cord and pedal pulses intact.     Lungs:   Respiratory effort: no intercostal retractions or accessory muscle usage.   Auscultation: no wheezing, rales/crackles, or rhonchi and clear to auscultation.     Breast:   Inspection/Palpation: no tenderness, discrete/distinct masses, skin changes, or abnormal secretions. Nipple appearance normal.     Abdomen:   Auscultation/Inspection/Palpation: no hepatomegaly, splenomegaly, masses,  tenderness or CVA tenderness and soft, non-distended bowel sounds preset.    Hernia: no palpable hernias.     Female Genitalia:   Vulva: no masses, tenderness or lesions   Bladder/Urethra: no urethral discharge or mass, normal meatus, bladder non-distended.   Vagina: no tenderness, erythema, cystocele, rectocele, abnormal vaginal discharge or vesicle(s) or ulcers   Cervix: no discharge, no cervical lacerations noted or motion tenderness and grossly normal   Uterus: normal size and shape and midline, non-tender, and no uterine prolapse.   Adnexa/Parametria: no parametrial tenderness or mass, no adnexal tenderness or ovarian mass.     Lymph Nodes:   Palpation: non tender submandibular nodes, axillary nodes, or inguinal nodes.     Rectal Exam:   Rectum: normal perianal skin.     TVUS:    IUP W CRL: 8w4d   JHONATHAN:3/23/25 by LMP, c/w todays u/s   FHT: 157      Assessment/Plan:  1. Initial obstetric visit in first trimester  -     US OB/GYN Procedure ([facility]) - Extended List  -     Urine Culture High Risk  -     POCT Urinalysis, Dipstick, Automated, W/O Scope  -     Cell-Free DNA Prenatal Screen; Future; Expected date: 08/15/2024  -     Cystic Fibrosis Mutation Analysis; Future; Expected date: 08/15/2024    2. 8 weeks gestation of pregnancy  -     Hemoglobin Electrophoresis Evaluation, Blood; Future; Expected date: 08/15/2024  -     Rubella Antibody, IgG; Future; Expected date: 08/15/2024  -     Varicella Zoster Antibody, IgG; Future; Expected date: 08/15/2024  -     HIV 1/2 Ag/Ab (4th Gen); Future; Expected date: 08/15/2024  -     SYPHILIS ANTIBODY (WITH REFLEX RPR); Future; Expected date: 08/15/2024  -     Hepatitis C Antibody; Future; Expected date: 08/15/2024  -     Hepatitis B Surface Antigen; Future; Expected date: 08/15/2024  -     Type & Screen; Future; Expected date: 08/15/2024  -     CBC Auto Differential; Future; Expected date: 08/15/2024  -     Cell-Free DNA Prenatal Screen; Future; Expected date:  08/15/2024  -     Cystic Fibrosis Mutation Analysis; Future; Expected date: 08/15/2024    3. Screening for malignant neoplasm of the cervix  -     Liquid-Based Pap Smear, Screening  -     Cell-Free DNA Prenatal Screen; Future; Expected date: 08/15/2024  -     Cystic Fibrosis Mutation Analysis; Future; Expected date: 08/15/2024           Prenatal counseling  Discussed appropriate weight gain for pregnancy  Tobacco avoidance/cessation  Illicit drug avoidance  PNL  PNV, folic acid     Pap   GC/CZ/TV     CFDNA     RTC 4 weeks ob check       This note was transcribed by Adela Hoffman. There may be transcription errors as a result, however minimal. Effort has been made to ensure accuracy of transcription, but any obvious errors or omissions should be clarified with the author of the document.

## 2024-08-15 ENCOUNTER — INITIAL PRENATAL (OUTPATIENT)
Dept: OBSTETRICS AND GYNECOLOGY | Facility: CLINIC | Age: 19
End: 2024-08-15
Payer: MEDICAID

## 2024-08-15 VITALS — SYSTOLIC BLOOD PRESSURE: 118 MMHG | BODY MASS INDEX: 25.24 KG/M2 | DIASTOLIC BLOOD PRESSURE: 70 MMHG | WEIGHT: 138 LBS

## 2024-08-15 DIAGNOSIS — Z34.91 INITIAL OBSTETRIC VISIT IN FIRST TRIMESTER: Primary | ICD-10-CM

## 2024-08-15 DIAGNOSIS — Z12.4 SCREENING FOR MALIGNANT NEOPLASM OF THE CERVIX: ICD-10-CM

## 2024-08-15 DIAGNOSIS — Z3A.08 8 WEEKS GESTATION OF PREGNANCY: ICD-10-CM

## 2024-08-15 LAB
BILIRUB UR QL STRIP: NORMAL
GLUCOSE UR QL STRIP: NEGATIVE
KETONES UR QL STRIP: NORMAL
LEUKOCYTE ESTERASE UR QL STRIP: NEGATIVE
PH, POC UA: NORMAL
POC BLOOD, URINE: NORMAL
POC NITRATES, URINE: NEGATIVE
PROT UR QL STRIP: NEGATIVE
SP GR UR STRIP: NORMAL (ref 1–1.03)
UROBILINOGEN UR STRIP-ACNC: NORMAL (ref 0.3–2.2)

## 2024-08-15 PROCEDURE — 87491 CHLMYD TRACH DNA AMP PROBE: CPT | Performed by: OBSTETRICS & GYNECOLOGY

## 2024-08-15 PROCEDURE — 87591 N.GONORRHOEAE DNA AMP PROB: CPT | Performed by: OBSTETRICS & GYNECOLOGY

## 2024-08-15 PROCEDURE — 87661 TRICHOMONAS VAGINALIS AMPLIF: CPT | Performed by: OBSTETRICS & GYNECOLOGY

## 2024-08-15 PROCEDURE — 87086 URINE CULTURE/COLONY COUNT: CPT | Performed by: OBSTETRICS & GYNECOLOGY

## 2024-08-15 RX ORDER — ONDANSETRON 4 MG/1
4 TABLET, ORALLY DISINTEGRATING ORAL EVERY 8 HOURS
COMMUNITY
Start: 2024-08-12

## 2024-08-30 ENCOUNTER — LAB VISIT (OUTPATIENT)
Dept: LAB | Facility: HOSPITAL | Age: 19
End: 2024-08-30
Attending: OBSTETRICS & GYNECOLOGY
Payer: MEDICAID

## 2024-08-30 DIAGNOSIS — Z3A.08 8 WEEKS GESTATION OF PREGNANCY: ICD-10-CM

## 2024-08-30 DIAGNOSIS — Z12.4 SCREENING FOR MALIGNANT NEOPLASM OF THE CERVIX: ICD-10-CM

## 2024-08-30 DIAGNOSIS — Z34.91 INITIAL OBSTETRIC VISIT IN FIRST TRIMESTER: ICD-10-CM

## 2024-08-30 LAB
ABO AND RH: ABNORMAL
ANTIBODY SCREEN: ABNORMAL
BASOPHILS # BLD AUTO: 0.02 X10(3)/MCL (ref 0.01–0.08)
BASOPHILS NFR BLD AUTO: 0.3 % (ref 0.1–1.2)
EOSINOPHIL # BLD AUTO: 0.06 X10(3)/MCL (ref 0.04–0.36)
EOSINOPHIL NFR BLD AUTO: 1 % (ref 0.7–7)
ERYTHROCYTE [DISTWIDTH] IN BLOOD BY AUTOMATED COUNT: 12.9 % (ref 11–14.5)
HBV SURFACE AG SERPL QL IA: NEGATIVE
HBV SURFACE AG SERPL QL IA: NORMAL
HCT VFR BLD AUTO: 35.7 % (ref 36–48)
HGB BLD-MCNC: 12.3 G/DL (ref 11.8–16)
IMM GRANULOCYTES # BLD AUTO: 0.02 X10(3)/MCL (ref 0–0.03)
IMM GRANULOCYTES NFR BLD AUTO: 0.3 % (ref 0–0.5)
LYMPHOCYTES # BLD AUTO: 1.42 X10(3)/MCL (ref 1.16–3.74)
LYMPHOCYTES NFR BLD AUTO: 24.7 % (ref 20–55)
MCH RBC QN AUTO: 29.7 PG (ref 27–34)
MCHC RBC AUTO-ENTMCNC: 34.5 G/DL (ref 31–37)
MCV RBC AUTO: 86.2 FL (ref 79–99)
MONOCYTES # BLD AUTO: 0.36 X10(3)/MCL (ref 0.24–0.36)
MONOCYTES NFR BLD AUTO: 6.3 % (ref 4.7–12.5)
NEUTROPHILS # BLD AUTO: 3.87 X10(3)/MCL (ref 1.56–6.13)
NEUTROPHILS NFR BLD AUTO: 67.4 % (ref 37–73)
NRBC BLD AUTO-RTO: 0 %
PLATELET # BLD AUTO: 245 X10(3)/MCL (ref 140–371)
PMV BLD AUTO: 9.6 FL (ref 9.4–12.4)
RBC # BLD AUTO: 4.14 X10(6)/MCL (ref 4–5.1)
RUBELLA AB, IGG (OLG): 49 IU/ML
SPECIMEN OUTDATE: ABNORMAL
WBC # BLD AUTO: 5.75 X10(3)/MCL (ref 4–11.5)

## 2024-08-30 PROCEDURE — 86850 RBC ANTIBODY SCREEN: CPT | Performed by: OBSTETRICS & GYNECOLOGY

## 2024-08-30 PROCEDURE — 86803 HEPATITIS C AB TEST: CPT

## 2024-08-30 PROCEDURE — 86870 RBC ANTIBODY IDENTIFICATION: CPT | Performed by: OBSTETRICS & GYNECOLOGY

## 2024-08-30 PROCEDURE — 85025 COMPLETE CBC W/AUTO DIFF WBC: CPT

## 2024-08-30 PROCEDURE — 86780 TREPONEMA PALLIDUM: CPT

## 2024-08-30 PROCEDURE — 87340 HEPATITIS B SURFACE AG IA: CPT

## 2024-08-30 PROCEDURE — 87389 HIV-1 AG W/HIV-1&-2 AB AG IA: CPT

## 2024-08-30 PROCEDURE — 36415 COLL VENOUS BLD VENIPUNCTURE: CPT

## 2024-08-30 PROCEDURE — 86762 RUBELLA ANTIBODY: CPT

## 2024-08-30 PROCEDURE — 86900 BLOOD TYPING SEROLOGIC ABO: CPT | Performed by: OBSTETRICS & GYNECOLOGY

## 2024-08-30 PROCEDURE — 83020 HEMOGLOBIN ELECTROPHORESIS: CPT

## 2024-08-30 PROCEDURE — 86787 VARICELLA-ZOSTER ANTIBODY: CPT

## 2024-08-31 LAB
HCV AB SERPL QL IA: NONREACTIVE
HIV 1+2 AB+HIV1 P24 AG SERPL QL IA: NONREACTIVE
T PALLIDUM AB SER QL: NONREACTIVE

## 2024-09-03 ENCOUNTER — TELEPHONE (OUTPATIENT)
Dept: OBSTETRICS AND GYNECOLOGY | Facility: CLINIC | Age: 19
End: 2024-09-03
Payer: MEDICAID

## 2024-09-03 LAB — ANTIBODY IDENTIFICATION: NORMAL

## 2024-09-03 NOTE — TELEPHONE ENCOUNTER
"Per BRIANNA "add her to tomorrow to come in and discuss this." Notified patient to be here for 2 PM. Pt verbalized understanding.   "

## 2024-09-03 NOTE — TELEPHONE ENCOUNTER
Spoke to Barby in the lab, per Barby patient has Anti D. Dr. Law needs to be notified. She is faxing over the lab results now.

## 2024-09-03 NOTE — TELEPHONE ENCOUNTER
----- Message from Zeina Martinez sent at 9/3/2024  8:20 AM CDT -----  Regarding: Needles LAB  Contact: Lab  [facility] called asking if patient ever recevied a RhoGam. We can call them back at [phone]

## 2024-09-04 ENCOUNTER — ROUTINE PRENATAL (OUTPATIENT)
Dept: OBSTETRICS AND GYNECOLOGY | Facility: CLINIC | Age: 19
End: 2024-09-04
Payer: MEDICAID

## 2024-09-04 VITALS — DIASTOLIC BLOOD PRESSURE: 70 MMHG | BODY MASS INDEX: 25.79 KG/M2 | WEIGHT: 141 LBS | SYSTOLIC BLOOD PRESSURE: 112 MMHG

## 2024-09-04 DIAGNOSIS — O26.891 RH NEGATIVE STATUS DURING PREGNANCY IN FIRST TRIMESTER: ICD-10-CM

## 2024-09-04 DIAGNOSIS — Z67.91 RH NEGATIVE STATUS DURING PREGNANCY IN FIRST TRIMESTER: ICD-10-CM

## 2024-09-04 DIAGNOSIS — Z3A.11 11 WEEKS GESTATION OF PREGNANCY: ICD-10-CM

## 2024-09-04 DIAGNOSIS — O09.899 MATERNAL VARICELLA, NON-IMMUNE: ICD-10-CM

## 2024-09-04 DIAGNOSIS — Z31.82 RH ISOIMMUNIZATION DUE TO ANTI-D ANTIBODY: Primary | ICD-10-CM

## 2024-09-04 DIAGNOSIS — Z28.39 MATERNAL VARICELLA, NON-IMMUNE: ICD-10-CM

## 2024-09-04 LAB
BILIRUB UR QL STRIP: NORMAL
GLUCOSE UR QL STRIP: NEGATIVE
KETONES UR QL STRIP: NORMAL
LEUKOCYTE ESTERASE UR QL STRIP: NEGATIVE
PH, POC UA: NORMAL
POC BLOOD, URINE: NORMAL
POC NITRATES, URINE: NEGATIVE
PROT UR QL STRIP: NEGATIVE
SP GR UR STRIP: NORMAL (ref 1–1.03)
UROBILINOGEN UR STRIP-ACNC: NORMAL (ref 0.3–2.2)
VZV IGG SER IA-ACNC: 0.4
VZV IGG SER QL IA: NEGATIVE

## 2024-09-04 PROCEDURE — 99213 OFFICE O/P EST LOW 20 MIN: CPT | Mod: TH,,, | Performed by: OBSTETRICS & GYNECOLOGY

## 2024-09-04 NOTE — PROGRESS NOTES
HPI  19 y.o.  at 11w3d here today to discuss lab results.     Antibody ID: Pos, Anti-D      ROS  Review of Systems   Constitutional:  Negative for chills and fever.   Gastrointestinal:  Negative for abdominal pain, constipation and diarrhea.   Genitourinary:  Negative for flank pain, genital sores, pelvic pain, urgency, vaginal bleeding, vaginal discharge, vaginal pain, postcoital bleeding and vaginal odor.         OBJECTIVE  /70   Wt 64 kg (141 lb)   LMP 2024 (Exact Date)   BMI 25.79 kg/m²     Gen: No distress  Abdomen: Gravid, non-tender  Extremities: No edema    FHT: 160      ASSESSMENT  1. 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 11 weeks gestation of pregnancy  - POCT Urinalysis, Dipstick, Automated, W/O Scope    3. Maternal varicella, non-immune    4. Rh negative status during pregnancy in first trimester        PLAN  Reviewed standard labor unit and kick count precautions.  Discussed pre-eclampsia precautions.  Discussed COVID-19 risks, social distancing, and isolation if respiratory symptoms develop.     Varicella nonimmune. Recommend vaccine following delivery. Avoidance of sick children, rashes, chicken pox.     Discussed anti D positive antibody on recent type and screen.  Patient does admit to having RhoGAM injection close to 8 weeks ago.  This is likely the source of the positive anti-D antibody; however given that the injection was 2 months ago,  will refer to [facility] for evaluation    RTC 4 weeks    This note was transcribed by Adela Hoffman. There may be transcription errors as a result, however minimal. Effort has been made to ensure accuracy of transcription, but any obvious errors or omissions should be clarified with the author of the document.       I agree with the above documentation.

## 2024-09-05 ENCOUNTER — TELEPHONE (OUTPATIENT)
Dept: OBSTETRICS AND GYNECOLOGY | Facility: CLINIC | Age: 19
End: 2024-09-05
Payer: MEDICAID

## 2024-09-05 ENCOUNTER — TELEPHONE (OUTPATIENT)
Dept: MATERNAL FETAL MEDICINE | Facility: CLINIC | Age: 19
End: 2024-09-05
Payer: MEDICAID

## 2024-09-05 DIAGNOSIS — O36.1190 ISOIMMUNIZATION FROM BLOOD GROUP INCOMPATIBILITY DURING PREGNANCY: Primary | ICD-10-CM

## 2024-09-05 LAB
HGB A MFR BLD ELPH: 97.4 % (ref 95.8–98)
HGB A2 MFR BLD ELPH: 2.6 % (ref 2–3.3)
HGB F MFR BLD ELPH: 0 % (ref 0–0.9)
HGB FRACT BLD ELPH-IMP: NORMAL
M HPLC HB VARIANT, B: NORMAL

## 2024-09-05 NOTE — TELEPHONE ENCOUNTER
Per Dr Estes,   Spoke to [facility]. Please get records- type and screen results where pt had RhoGam given prior to M referral .

## 2024-09-06 NOTE — TELEPHONE ENCOUNTER
Spoke with patient, patient states she went to [facility] ER on 7/27/24 and received a Rhogam shot. Patient states that she has a Rhogam card. Attempted to contact [facility] medical records department, no ans. Left a message for them to call us back so we can request records.

## 2024-09-12 ENCOUNTER — TELEPHONE (OUTPATIENT)
Dept: OBSTETRICS AND GYNECOLOGY | Facility: CLINIC | Age: 19
End: 2024-09-12
Payer: MEDICAID

## 2024-09-12 DIAGNOSIS — Z31.82 RH ISOIMMUNIZATION DUE TO ANTI-D ANTIBODY: Primary | ICD-10-CM

## 2024-09-12 NOTE — TELEPHONE ENCOUNTER
----- Message from Lorena Alan MA sent at 9/10/2024  2:05 PM CDT -----  Regarding: Change of appt and labs  Due to the weather our office is closed tomorrow. Your patient had to be rescheduled for 10/10/2024 at 9:00. However, Dr. Lutz is requesting that your office order titers for the patient to do in the meantime. The patient is aware of the new appt.

## 2024-09-18 ENCOUNTER — LAB VISIT (OUTPATIENT)
Dept: LAB | Facility: HOSPITAL | Age: 19
End: 2024-09-18
Attending: OBSTETRICS & GYNECOLOGY
Payer: MEDICAID

## 2024-09-18 DIAGNOSIS — Z31.82 RH ISOIMMUNIZATION DUE TO ANTI-D ANTIBODY: ICD-10-CM

## 2024-09-18 LAB — ANTIBODY TITER: 2

## 2024-09-18 PROCEDURE — 36415 COLL VENOUS BLD VENIPUNCTURE: CPT

## 2024-09-18 PROCEDURE — 86886 COOMBS TEST INDIRECT TITER: CPT | Performed by: OBSTETRICS & GYNECOLOGY

## 2024-09-30 NOTE — PROGRESS NOTES
HPI  19 y.o.  at 15w1d here for OB check.  Doing well.    Did receive her records from [facility].  Her previous anti        ROS  Review of Systems   Constitutional:  Negative for chills and fever.   Gastrointestinal:  Negative for abdominal pain, constipation and diarrhea.   Genitourinary:  Negative for flank pain, genital sores, pelvic pain, urgency, vaginal bleeding, vaginal discharge, vaginal pain, postcoital bleeding and vaginal odor.         OBJECTIVE  /82   Wt 63.9 kg (140 lb 12.8 oz)   LMP 2024 (Exact Date)   BMI 25.75 kg/m²     Gen: No distress  Abdomen: Gravid, non-tender  Extremities: No edema    FHT: 140    ASSESSMENT  1. Rh negative state in antepartum period, second trimester    2. 15 weeks gestation of pregnancy  - POCT Urinalysis, Dipstick, Automated, W/O Scope  - US OB 14+ Wks, TransAbd, Single Gestation; Future        PLAN  Reviewed standard labor unit and kick count precautions.  Discussed pre-eclampsia precautions.  Discussed COVID-19 risks, social distancing, and isolation if respiratory symptoms develop.     Records received.  Previous negative antibody confirms current anti-D likely due to RhoGAM injection Positive anti-D antibody due to recent RhoGam injection. MFM evaluation not needed at this time.   Will cancel appt.     Anatomy scan ordered.     RTC 4 wks ob check     This note was transcribed by Adela Hoffman. There may be transcription errors as a result, however minimal. Effort has been made to ensure accuracy of transcription, but any obvious errors or omissions should be clarified with the author of the document.       I agree with the above documentation.

## 2024-10-01 ENCOUNTER — ROUTINE PRENATAL (OUTPATIENT)
Dept: OBSTETRICS AND GYNECOLOGY | Facility: CLINIC | Age: 19
End: 2024-10-01
Payer: MEDICAID

## 2024-10-01 VITALS
SYSTOLIC BLOOD PRESSURE: 120 MMHG | BODY MASS INDEX: 25.75 KG/M2 | DIASTOLIC BLOOD PRESSURE: 82 MMHG | WEIGHT: 140.81 LBS

## 2024-10-01 DIAGNOSIS — O26.892 RH NEGATIVE STATE IN ANTEPARTUM PERIOD, SECOND TRIMESTER: Primary | ICD-10-CM

## 2024-10-01 DIAGNOSIS — Z67.91 RH NEGATIVE STATE IN ANTEPARTUM PERIOD, SECOND TRIMESTER: Primary | ICD-10-CM

## 2024-10-01 DIAGNOSIS — Z3A.15 15 WEEKS GESTATION OF PREGNANCY: ICD-10-CM

## 2024-10-04 NOTE — PROGRESS NOTES
"Chief Complaint:     Chief Complaint   Patient presents with    STI Testing     C/o itching x2 weeks, white VD with no odor          HPI:   17 y.o.  WF  presents with c/o itching, vaginal discharge  x2 weeks, denies odor. Pt is requesting STI screening.     LMP: 3/7/23  Frequency: q month     Cycle Length: 7 days   Flow: normal  Intermenstrual Bleeding: No  Postcoital Bleeding: No  Dysmenorrhea: No  Sexually Active: Yes   Dyspareunia: No  Contraception: Xulane patches  H/o STI: No   Last pap: n/a  H/o Abnormal Pap: n/a    Gardasil: 0/3   MMG: n/a  H/o abnl  MMG: n/a      Current Outpatient Medications:     XULANE 150-35 mcg/24 hr, Apply weekly x 3 weeks then patch free x 1 week, Disp: 3 patch, Rfl: 9    Review of patient's allergies indicates:  No Known Allergies    Social History     Tobacco Use    Smoking status: Never    Smokeless tobacco: Never   Substance Use Topics    Alcohol use: Never    Drug use: Never       Review of Systems   Constitutional:  Negative for appetite change, chills, fatigue, fever and unexpected weight change.   Gastrointestinal:  Negative for abdominal pain, blood in stool, constipation, diarrhea, nausea, vomiting and reflux.   Genitourinary:  Positive for vaginal discharge. Negative for bladder incontinence, decreased libido, dysmenorrhea, dyspareunia, dysuria, flank pain, frequency, genital sores, hematuria, hot flashes, menorrhagia, menstrual problem, pelvic pain, urgency, vaginal bleeding, vaginal pain, urinary incontinence, postcoital bleeding, postmenopausal bleeding, vaginal dryness and vaginal odor.        +vaginal itching   Integumentary:  Negative for rash, acne, hair changes and mole/lesion.   Neurological:  Negative for headaches.        Physical Exam:   Vitals:    23 1006   BP: 110/64   BP Location: Right arm   Temp: 98.1 °F (36.7 °C)   Weight: 58.8 kg (129 lb 10.1 oz)   Height: 5' 3.78" (1.62 m)       Body mass index is 22.4 kg/m².    Physical " Gris, on HIPAA, advised antibiotic sent in.  She will make appt if no better   Exam  Constitutional:       Appearance: She is well-developed.   Abdominal:      General: Abdomen is flat. Bowel sounds are normal. There is no distension.      Palpations: Abdomen is soft. There is no mass.      Tenderness: There is no abdominal tenderness.      Hernia: No hernia is present.   Genitourinary:     Vagina: Vaginal discharge (scant, white) present. No erythema, tenderness or bleeding.      Cervix: No cervical motion tenderness or discharge.      Uterus: Not enlarged and not tender.       Adnexa:         Right: No mass, tenderness or fullness.          Left: No mass, tenderness or fullness.     Neurological:      Mental Status: She is alert and oriented to person, place, and time.   Psychiatric:         Attention and Perception: Attention normal.         Mood and Affect: Mood normal.       Assessment:     There is no problem list on file for this patient.      Health Maintenance Due   Topic Date Due    Hepatitis C Screening  Never done    Lipid Panel  Never done    COVID-19 Vaccine (1) Never done    HPV Vaccines (1 - 2-dose series) Never done    HIV Screening  Never done    Chlamydia Screening  Never done    Influenza Vaccine (1) 09/01/2022     Health Maintenance Topics with due status: Not Due       Topic Last Completion Date    TETANUS VACCINE 07/12/2016           Plan:    Barby was seen today for sti testing.    Diagnoses and all orders for this visit:    Vaginal discharge  -     POCT Wet Prep (+WBCs)  -     MDL Sendout Test (GC/CZ/TV)      - AVOID: Scented soaps or shampoos, Bubble bath, Scented detergens, Feminine sprays, douches, powders    - Fragrance-free pH neutral soap    - Unscented detergents    Vaginal itching    Screen for sexually transmitted diseases  -     MDL Sendout Test      Skyla Sierra

## 2024-10-06 ENCOUNTER — PATIENT MESSAGE (OUTPATIENT)
Dept: OTHER | Facility: OTHER | Age: 19
End: 2024-10-06
Payer: MEDICAID

## 2024-10-13 ENCOUNTER — PATIENT MESSAGE (OUTPATIENT)
Dept: OTHER | Facility: OTHER | Age: 19
End: 2024-10-13
Payer: MEDICAID

## 2024-10-29 ENCOUNTER — ROUTINE PRENATAL (OUTPATIENT)
Dept: OBSTETRICS AND GYNECOLOGY | Facility: CLINIC | Age: 19
End: 2024-10-29
Payer: MEDICAID

## 2024-10-29 VITALS
DIASTOLIC BLOOD PRESSURE: 78 MMHG | BODY MASS INDEX: 26.19 KG/M2 | SYSTOLIC BLOOD PRESSURE: 126 MMHG | WEIGHT: 143.19 LBS

## 2024-10-29 DIAGNOSIS — Z3A.19 19 WEEKS GESTATION OF PREGNANCY: ICD-10-CM

## 2024-10-29 DIAGNOSIS — O26.892 RH NEGATIVE STATE IN ANTEPARTUM PERIOD, SECOND TRIMESTER: Primary | ICD-10-CM

## 2024-10-29 DIAGNOSIS — Z67.91 RH NEGATIVE STATE IN ANTEPARTUM PERIOD, SECOND TRIMESTER: Primary | ICD-10-CM

## 2024-10-29 PROCEDURE — 99213 OFFICE O/P EST LOW 20 MIN: CPT | Mod: TH,,, | Performed by: OBSTETRICS & GYNECOLOGY

## 2024-10-29 RX ORDER — PNV NO.153/FA/OM3/DHA/EPA/FISH 400-35-25
TABLET,CHEWABLE ORAL
COMMUNITY

## 2024-11-03 ENCOUNTER — PATIENT MESSAGE (OUTPATIENT)
Dept: OTHER | Facility: OTHER | Age: 19
End: 2024-11-03
Payer: MEDICAID

## 2024-11-07 ENCOUNTER — HOSPITAL ENCOUNTER (OUTPATIENT)
Dept: RADIOLOGY | Facility: HOSPITAL | Age: 19
Discharge: HOME | End: 2024-11-07
Attending: OBSTETRICS & GYNECOLOGY
Payer: MEDICAID

## 2024-11-07 DIAGNOSIS — Z3A.15 15 WEEKS GESTATION OF PREGNANCY: ICD-10-CM

## 2024-11-07 PROCEDURE — 76805 OB US >/= 14 WKS SNGL FETUS: CPT | Mod: TC

## 2024-11-21 NOTE — PROGRESS NOTES
HPI  19 y.o.  at 23w2d here for OB check.  Doing well.     Anatomy US  FINDINGS:  Transabdominal imaging was performed.  There is a single intrauterine pregnancy what appears to be unremarkable female genitalia currently in a cephalic presentation.  The fetus demonstrates normal spontaneous motility and normal cardiac activity with a fetal heart rate of 146 beats per minute.  A grade 0 placenta is located high anteriorly.  The volume of amniotic fluid is within normal limits with an HUEY 15.1 cm and 4 vertical pockets measuring greater than 2 cm.  The fetal head, spine (where visualized), heart, gastric lumen, kidneys, three-vessel umbilical cord and cord insertion site (where visualized), urinary bladder and extremities (where visualized) appear unremarkable.  I see no definite fetal or maternal abnormalities based on these images.     Fetal measurements: Biparietal diameter is 4.6 cm/19 weeks 5 days/19th percentile, head circumference is 17.5 cm/20 weeks 0 days/19th percentile, abdominal circumference is 14.2 cm/19 weeks 4 days/14th percentile, femur length is 3.3 cm/20 weeks 3 days/37th percentile and the estimated fetal weight is 324 g/19 weeks 6 days/18th percentile (based on estimated gestational age).     Impression:     1. There is a single intrauterine pregnancy currently in a cephalic presentation with a mean sonographic gestational age of 20 weeks 1 day.  No significant fetal or maternal abnormalities are appreciated.      ROS  Review of Systems   Constitutional:  Negative for chills and fever.   Gastrointestinal:  Negative for abdominal pain, constipation and diarrhea.   Genitourinary:  Negative for flank pain, genital sores, pelvic pain, urgency, vaginal bleeding, vaginal discharge, vaginal pain, postcoital bleeding and vaginal odor.         OBJECTIVE  /74   Wt 65.2 kg (143 lb 12.8 oz)   LMP 2024 (Exact Date)   BMI 26.30 kg/m²     Gen: No distress  Abdomen: Gravid,  non-tender  Extremities: No edema    FHT: 160  FH: 22 cm    ASSESSMENT  1. 23 weeks gestation of pregnancy  - POCT Urinalysis, Dipstick, Automated, W/O Scope    2. Rh negative state in antepartum period, second trimester    3. Maternal varicella, non-immune        PLAN  Reviewed standard labor unit and kick count precautions.  Discussed pre-eclampsia precautions.  Discussed COVID-19 risks, social distancing, and isolation if respiratory symptoms develop.     Anatomy scan demonstrated low normal overall growth.  Recommend repeat US at follow up appt    CBC RPR O'Reed RhoGam to be done at 28 wks     RTC ob check and growth US in 4 weeks    This note was transcribed by Adela Hoffman. There may be transcription errors as a result, however minimal. Effort has been made to ensure accuracy of transcription, but any obvious errors or omissions should be clarified with the author of the document.       I agree with the above documentation.

## 2024-11-26 ENCOUNTER — ROUTINE PRENATAL (OUTPATIENT)
Dept: OBSTETRICS AND GYNECOLOGY | Facility: CLINIC | Age: 19
End: 2024-11-26
Payer: MEDICAID

## 2024-11-26 VITALS — DIASTOLIC BLOOD PRESSURE: 74 MMHG | BODY MASS INDEX: 26.3 KG/M2 | SYSTOLIC BLOOD PRESSURE: 118 MMHG | WEIGHT: 143.81 LBS

## 2024-11-26 DIAGNOSIS — O09.899 MATERNAL VARICELLA, NON-IMMUNE: ICD-10-CM

## 2024-11-26 DIAGNOSIS — Z28.39 MATERNAL VARICELLA, NON-IMMUNE: ICD-10-CM

## 2024-11-26 DIAGNOSIS — Z67.91 RH NEGATIVE STATE IN ANTEPARTUM PERIOD, SECOND TRIMESTER: Primary | ICD-10-CM

## 2024-11-26 DIAGNOSIS — O26.892 RH NEGATIVE STATE IN ANTEPARTUM PERIOD, SECOND TRIMESTER: Primary | ICD-10-CM

## 2024-11-26 DIAGNOSIS — Z3A.23 23 WEEKS GESTATION OF PREGNANCY: ICD-10-CM

## 2024-12-01 ENCOUNTER — PATIENT MESSAGE (OUTPATIENT)
Dept: OTHER | Facility: OTHER | Age: 19
End: 2024-12-01
Payer: MEDICAID

## 2024-12-15 ENCOUNTER — PATIENT MESSAGE (OUTPATIENT)
Dept: OTHER | Facility: OTHER | Age: 19
End: 2024-12-15
Payer: MEDICAID

## 2024-12-19 NOTE — PROGRESS NOTES
"  HPI  19 y.o.  at 27w4d here for OB check, growth scan due to low normal fetal growth on anatomy scan.  Reports positive fetal movement. denies LOF, CTX.       ROS  Review of Systems   Constitutional:  Negative for chills and fever.   Gastrointestinal:  Negative for abdominal pain, constipation and diarrhea.   Genitourinary:  Negative for flank pain, genital sores, pelvic pain, urgency, vaginal bleeding, vaginal discharge, vaginal pain, postcoital bleeding and vaginal odor.         OBJECTIVE  /68 (BP Location: Right arm, Patient Position: Sitting)   Temp 97.7 °F (36.5 °C) (Temporal)   Ht 5' 2" (1.575 m)   Wt 67.6 kg (149 lb)   LMP 2024 (Exact Date)   BMI 27.25 kg/m²     Gen: No distress  Abdomen: Gravid, non-tender  Extremities: No edema    FHT: 150    BPD: 28w0d  HC: 27w3d  AC: 26w1d, 8%ile  FL: 28w4d  EFW: 1066g, 42%tile    BPP:   HUEY:14.6    Presentation: vertex       ASSESSMENT  1. Rh negative state in antepartum period, second trimester  - CBC Auto Differential; Future  - GTT 1 Hour; Future  - SYPHILIS ANTIBODY (WITH REFLEX RPR); Future  - Type & Screen; Future  - Prepare Rh Immune Globulin; Future    2. 27 weeks gestation of pregnancy  - POCT Urinalysis, Dipstick, Automated, W/O Scope  - CBC Auto Differential; Future  - GTT 1 Hour; Future  - SYPHILIS ANTIBODY (WITH REFLEX RPR); Future  - Type & Screen; Future  - Prepare Rh Immune Globulin; Future  - US OB/GYN Procedure (Viewpoint) - Extended List - Future; Future  - US OB/GYN Procedure (Viewpoint) - Extended List - Future        PLAN  Reviewed standard labor unit and kick count precautions.  Discussed pre-eclampsia precautions.  Discussed COVID-19 risks, social distancing, and isolation if respiratory symptoms develop.     CBC RPR O'CASTREJON   Rhogam    Discussed lagging AC.    Recommend kick counts twice daily.  Report to JEAN for decreased FM/failed HUBERT  P.o. hydration  MFM referral for follow up US    RTC 2 weeks ob check    This " note was transcribed by Adela Hoffman. There may be transcription errors as a result, however minimal. Effort has been made to ensure accuracy of transcription, but any obvious errors or omissions should be clarified with the author of the document.       I agree with the above documentation.

## 2024-12-26 ENCOUNTER — ROUTINE PRENATAL (OUTPATIENT)
Dept: OBSTETRICS AND GYNECOLOGY | Facility: CLINIC | Age: 19
End: 2024-12-26
Payer: MEDICAID

## 2024-12-26 ENCOUNTER — TELEPHONE (OUTPATIENT)
Dept: MATERNAL FETAL MEDICINE | Facility: CLINIC | Age: 19
End: 2024-12-26
Payer: MEDICAID

## 2024-12-26 VITALS
WEIGHT: 149 LBS | HEIGHT: 62 IN | DIASTOLIC BLOOD PRESSURE: 68 MMHG | TEMPERATURE: 98 F | SYSTOLIC BLOOD PRESSURE: 122 MMHG | BODY MASS INDEX: 27.42 KG/M2

## 2024-12-26 DIAGNOSIS — O36.5990 FETAL GROWTH RESTRICTION ANTEPARTUM: Primary | ICD-10-CM

## 2024-12-26 DIAGNOSIS — O26.892 RH NEGATIVE STATE IN ANTEPARTUM PERIOD, SECOND TRIMESTER: ICD-10-CM

## 2024-12-26 DIAGNOSIS — Z67.91 RH NEGATIVE STATE IN ANTEPARTUM PERIOD, SECOND TRIMESTER: ICD-10-CM

## 2024-12-26 DIAGNOSIS — Z3A.27 27 WEEKS GESTATION OF PREGNANCY: ICD-10-CM

## 2024-12-26 LAB
BILIRUB UR QL STRIP: ABNORMAL
GLUCOSE UR QL STRIP: NEGATIVE
KETONES UR QL STRIP: ABNORMAL
LEUKOCYTE ESTERASE UR QL STRIP: NEGATIVE
PH, POC UA: ABNORMAL
POC BLOOD, URINE: ABNORMAL
POC NITRATES, URINE: NEGATIVE
PROT UR QL STRIP: POSITIVE
SP GR UR STRIP: ABNORMAL (ref 1–1.03)
UROBILINOGEN UR STRIP-ACNC: ABNORMAL (ref 0.3–2.2)

## 2025-01-08 PROBLEM — O36.5930 MATERNAL CARE FOR OTHER KNOWN OR SUSPECTED POOR FETAL GROWTH, THIRD TRIMESTER, NOT APPLICABLE OR UNSPECIFIED: Status: ACTIVE | Noted: 2025-01-08

## 2025-01-08 NOTE — PROGRESS NOTES
HPI  19 y.o.  at 30w1d here for OB check.  Reports positive fetal movement. denies LOF, CTX.     H&H: 10.6/31.8  O'CASTREJON: 103  RPR: nonreactive     ROS  Review of Systems   Constitutional:  Negative for chills and fever.   Gastrointestinal:  Negative for abdominal pain, constipation and diarrhea.   Genitourinary:  Negative for flank pain, genital sores, pelvic pain, urgency, vaginal bleeding, vaginal discharge, vaginal pain, postcoital bleeding and vaginal odor.         OBJECTIVE  /76   Wt 68.3 kg (150 lb 9.6 oz)   LMP 2024 (Exact Date)   BMI 27.55 kg/m²     Gen: No distress  Abdomen: Gravid, non-tender  Extremities: No edema    FHT: 160s      ASSESSMENT  1. Maternal care for other known or suspected poor fetal growth, third trimester, not applicable or unspecified    2. 30 weeks gestation of pregnancy  - POCT Urinalysis, Dipstick, Automated, W/O Scope    3. Rh negative status during pregnancy, third trimester        PLAN  Reviewed standard labor unit and kick count precautions.  Discussed pre-eclampsia precautions.  Discussed COVID-19 risks, social distancing, and isolation if respiratory symptoms develop.      RHOGAM administered     MFM note 25:  There is low normal fetal growth with an EFW of 1215 g at the 21% and lagging AC measurement at the 7% on 25  AFV is normal.  Fetal testing is reassuring today. We will plan to check interval growth in 3 weeks.   keep MFM appt    Kick counts BID    RTC 2 weeks ob check     This note was transcribed by Adela Hoffman. There may be transcription errors as a result, however minimal. Effort has been made to ensure accuracy of transcription, but any obvious errors or omissions should be clarified with the author of the document.       I agree with the above documentation.

## 2025-01-08 NOTE — PROGRESS NOTES
Maternal Fetal Medicine New Consult    Subjective:     Patient ID: [id number]    Chief Complaint: mfm consult w/us      HPI: 19 y.o.  female  at 29w4d gestation with Estimated Date of Delivery: 3/23/25 by LMP, consistent with early US. She is sent for MFM consultation for suspected fetal growth restriction.     She had suspected fetal growth restriction on outside ultrasound.  She had negative cell free DNA with primary OB.  Patient was accompanied by her aunt Rebecca       She denies any personal or family history of aneuploidy or anomalies. She denies any exposure to high fevers, viral rashes, illicit drugs or alcohol in this pregnancy.  She denies any leaking fluid, vaginal bleeding, contractions, decreased fetal movement. Denies headaches, visual disturbances, or epigastric pain.       Pregnancy complications include:  Patient Active Problem List   Diagnosis    Rh negative state in antepartum period, second trimester    Maternal care for other known or suspected poor fetal growth, third trimester, not applicable or unspecified       Past Medical History:   Diagnosis Date    Rh incompatibility     O-Neg       Past Surgical History:   Procedure Laterality Date    WISDOM TOOTH EXTRACTION  2020    All four were removed       Family History   Problem Relation Name Age of Onset    Breast cancer Maternal Grandmother MGG     Colon cancer Neg Hx      Ovarian cancer Neg Hx      Uterine cancer Neg Hx      Cervical cancer Neg Hx         Social History     Socioeconomic History    Marital status: Single   Tobacco Use    Smoking status: Never     Passive exposure: Current    Smokeless tobacco: Never   Substance and Sexual Activity    Alcohol use: Never    Drug use: Never    Sexual activity: Yes     Partners: Male     Comment: STI: CZ       Current Outpatient Medications   Medication Sig Dispense Refill    PNV no.[id number] (PRENATAL GUMMIES) 400 mcg-35 mg- 25 mg-5 mg Chew Take by mouth.       No current  "facility-administered medications for this visit.       Review of patient's allergies indicates:  No Known Allergies     Review of Systems   12 point review of systems conducted, negative except as stated in the history of present illness. See HPI for details.      Objective:     Visit Vitals  /83 (BP Location: Left arm, Patient Position: Sitting)   Pulse 108   Ht 5' 2" (1.575 m)   Wt 68.3 kg (150 lb 9.6 oz)   LMP 2024 (Exact Date)   BMI 27.55 kg/m²        Physical Exam  Vitals and nursing note reviewed.   Constitutional:       General: She is not in acute distress.     Appearance: Normal appearance.   HENT:      Head: Normocephalic and atraumatic.      Nose: Nose normal. No congestion.      Mouth/Throat:      Pharynx: Oropharynx is clear.   Eyes:      General: No scleral icterus.     Conjunctiva/sclera: Conjunctivae normal.   Cardiovascular:      Rate and Rhythm: Normal rate and regular rhythm.   Pulmonary:      Effort: No respiratory distress.      Breath sounds: Normal breath sounds. No wheezing.   Abdominal:      General: Abdomen is flat.      Palpations: Abdomen is soft.      Tenderness: There is no abdominal tenderness. There is no right CVA tenderness, left CVA tenderness or guarding.      Comments: No CVA tenderness gravid uterus.    Musculoskeletal:         General: Normal range of motion.      Cervical back: Neck supple.      Right lower leg: No edema.      Left lower leg: No edema.   Skin:     General: Skin is warm.      Findings: No bruising or rash.   Neurological:      General: No focal deficit present.      Mental Status: She is oriented to person, place, and time.      Deep Tendon Reflexes: Reflexes normal.      Comments: Normal reflexes   Psychiatric:         Mood and Affect: Mood normal.         Behavior: Behavior normal.         Thought Content: Thought content normal.         Judgment: Judgment normal.         Assessment/Plan:     19 y.o.  female with IUP at 29w4d    Low normal " fetal growth with lagging AC measurement  There is low normal fetal growth with an EFW of 1215 g at the 21% and lagging AC measurement at the 7% on 1/9/25  AFV is normal.     Because of range of error of ultrasound at this gestational age and possibility of more significant fetal growth restriction, a biophysical profile was done which was 8/8, and umbilical artery Doppler was done which was normal.    Briefly discussed various causes of fetal growth restriction.  She had negative cell free DNA with her primary OB.  At this time, suggest regular pregnancy care and we will plan to see her back in 3 weeks to repeat fetal growth assessment.  Fetal kick count instructions given.    Patient was counseled the lagging abdominal circumference is probably a benign condition especially with no risk factors for fetal growth restriction.  Importance of healthy diet, resting in lateral recumbent position, and doing fetal kick counts discussed.  Fetal testing is reassuring today.  We will plan to check interval growth in 3 weeks.  Potential causes of fetal growth restriction discussed.  Patient had a negative cell free DNA.  She was offered an amniocentesis after discussing benefits and risks including detection of micro deletions duplications that the amniocentesis is not check for patient did not want to have an amniocentesis.        Follow up in about 3 weeks (around 1/30/2025) for MFM Followup, Repeat Ultrasound.     Future Appointments   Date Time Provider Department Center   1/13/2025  8:40 AM Franklyn Law MD [facility]        Patient was evaluated and examined by Dr. Lutz. JERRY Tan, helped in pre charting of part of note.    This note was created with the assistance of [employer] voice recognition software. There may be transcription errors as a result of using this technology, however minimal. Effort has been made to ensure accuracy of transcription, but any obvious errors or omissions should  be clarified with the author of the document.

## 2025-01-09 ENCOUNTER — PROCEDURE VISIT (OUTPATIENT)
Dept: MATERNAL FETAL MEDICINE | Facility: CLINIC | Age: 20
End: 2025-01-09
Payer: MEDICAID

## 2025-01-09 ENCOUNTER — OFFICE VISIT (OUTPATIENT)
Dept: MATERNAL FETAL MEDICINE | Facility: CLINIC | Age: 20
End: 2025-01-09
Payer: MEDICAID

## 2025-01-09 VITALS
SYSTOLIC BLOOD PRESSURE: 131 MMHG | DIASTOLIC BLOOD PRESSURE: 83 MMHG | HEIGHT: 62 IN | WEIGHT: 150.63 LBS | BODY MASS INDEX: 27.72 KG/M2 | HEART RATE: 108 BPM

## 2025-01-09 DIAGNOSIS — Z67.91 RH NEGATIVE STATE IN ANTEPARTUM PERIOD, SECOND TRIMESTER: ICD-10-CM

## 2025-01-09 DIAGNOSIS — O36.5930 MATERNAL CARE FOR OTHER KNOWN OR SUSPECTED POOR FETAL GROWTH, THIRD TRIMESTER, NOT APPLICABLE OR UNSPECIFIED: Primary | ICD-10-CM

## 2025-01-09 DIAGNOSIS — Z3A.27 27 WEEKS GESTATION OF PREGNANCY: ICD-10-CM

## 2025-01-09 DIAGNOSIS — O26.892 RH NEGATIVE STATE IN ANTEPARTUM PERIOD, SECOND TRIMESTER: ICD-10-CM

## 2025-01-09 DIAGNOSIS — O36.1190 ISOIMMUNIZATION FROM BLOOD GROUP INCOMPATIBILITY DURING PREGNANCY: ICD-10-CM

## 2025-01-09 DIAGNOSIS — O36.5990 FETAL GROWTH RESTRICTION ANTEPARTUM: ICD-10-CM

## 2025-01-10 ENCOUNTER — INFUSION (OUTPATIENT)
Facility: HOSPITAL | Age: 20
End: 2025-01-10
Attending: OBSTETRICS & GYNECOLOGY
Payer: MEDICAID

## 2025-01-10 VITALS
SYSTOLIC BLOOD PRESSURE: 131 MMHG | HEART RATE: 107 BPM | RESPIRATION RATE: 20 BRPM | DIASTOLIC BLOOD PRESSURE: 70 MMHG | OXYGEN SATURATION: 100 % | TEMPERATURE: 98 F

## 2025-01-10 DIAGNOSIS — Z67.91 RH NEGATIVE STATE IN ANTEPARTUM PERIOD, SECOND TRIMESTER: Primary | ICD-10-CM

## 2025-01-10 DIAGNOSIS — O26.892 RH NEGATIVE STATE IN ANTEPARTUM PERIOD, SECOND TRIMESTER: Primary | ICD-10-CM

## 2025-01-10 DIAGNOSIS — Z3A.27 27 WEEKS GESTATION OF PREGNANCY: ICD-10-CM

## 2025-01-10 PROCEDURE — 63600519 RHOGAM PHARM REV CODE 636 ALT 250 W HCPCS: Performed by: OBSTETRICS & GYNECOLOGY

## 2025-01-10 PROCEDURE — 96372 THER/PROPH/DIAG INJ SC/IM: CPT

## 2025-01-10 RX ADMIN — HUMAN RHO(D) IMMUNE GLOBULIN 300 MCG: 1500 SOLUTION INTRAMUSCULAR; INTRAVENOUS at 11:01

## 2025-01-12 ENCOUNTER — PATIENT MESSAGE (OUTPATIENT)
Dept: OTHER | Facility: OTHER | Age: 20
End: 2025-01-12
Payer: MEDICAID

## 2025-01-13 ENCOUNTER — ROUTINE PRENATAL (OUTPATIENT)
Dept: OBSTETRICS AND GYNECOLOGY | Facility: CLINIC | Age: 20
End: 2025-01-13
Payer: MEDICAID

## 2025-01-13 VITALS
WEIGHT: 150.63 LBS | BODY MASS INDEX: 27.55 KG/M2 | DIASTOLIC BLOOD PRESSURE: 76 MMHG | SYSTOLIC BLOOD PRESSURE: 124 MMHG

## 2025-01-13 DIAGNOSIS — O36.5930 MATERNAL CARE FOR OTHER KNOWN OR SUSPECTED POOR FETAL GROWTH, THIRD TRIMESTER, NOT APPLICABLE OR UNSPECIFIED: Primary | ICD-10-CM

## 2025-01-13 DIAGNOSIS — Z67.91 RH NEGATIVE STATUS DURING PREGNANCY, THIRD TRIMESTER: ICD-10-CM

## 2025-01-13 DIAGNOSIS — O26.893 RH NEGATIVE STATUS DURING PREGNANCY, THIRD TRIMESTER: ICD-10-CM

## 2025-01-13 DIAGNOSIS — Z3A.30 30 WEEKS GESTATION OF PREGNANCY: ICD-10-CM

## 2025-01-13 PROCEDURE — 99214 OFFICE O/P EST MOD 30 MIN: CPT | Mod: TH,,, | Performed by: OBSTETRICS & GYNECOLOGY

## 2025-01-16 DIAGNOSIS — O36.1190 ISOIMMUNIZATION FROM BLOOD GROUP INCOMPATIBILITY DURING PREGNANCY: Primary | ICD-10-CM

## 2025-01-16 DIAGNOSIS — Z36.89 ENCOUNTER FOR ULTRASOUND TO ASSESS FETAL GROWTH: ICD-10-CM

## 2025-01-16 DIAGNOSIS — O36.5930 MATERNAL CARE FOR OTHER KNOWN OR SUSPECTED POOR FETAL GROWTH, THIRD TRIMESTER, NOT APPLICABLE OR UNSPECIFIED: ICD-10-CM

## 2025-01-24 NOTE — PROGRESS NOTES
HPI  19 y.o.  at 32w1d here for OB check.  Reports positive fetal movement. denies LOF, CTX.       ROS  Review of Systems   Constitutional:  Negative for chills and fever.   Gastrointestinal:  Negative for abdominal pain, constipation and diarrhea.   Genitourinary:  Negative for flank pain, genital sores, pelvic pain, urgency, vaginal bleeding, vaginal discharge, vaginal pain, postcoital bleeding and vaginal odor.         OBJECTIVE  /86   Wt 68.5 kg (151 lb)   LMP 2024 (Exact Date)   BMI 27.62 kg/m²     Gen: No distress  Abdomen: Gravid, non-tender  Extremities: No edema    FHT: 125  FH: 30cm    ASSESSMENT  1. Rh negative status during pregnancy, third trimester    2. 32 weeks gestation of pregnancy  - POCT Urinalysis, Dipstick, Automated, W/O Scope    3. Maternal care for other known or suspected poor fetal growth, third trimester, not applicable or unspecified        PLAN  Reviewed standard labor unit and kick count precautions.  Discussed pre-eclampsia precautions.  Discussed COVID-19 risks, social distancing, and isolation if respiratory symptoms develop.     MFM note 25:  There is low normal fetal growth with an EFW of 1215 g at the 21% and lagging AC measurement at the 7% on 25  AFV is normal.  Fetal testing is reassuring today. We will plan to check interval growth in 3 weeks.   keep MFM appt     Kick counts BID    RTC 2 weeks ob check     This note was transcribed by Adela Hoffman. There may be transcription errors as a result, however minimal. Effort has been made to ensure accuracy of transcription, but any obvious errors or omissions should be clarified with the author of the document.       I agree with the above documentation.

## 2025-01-26 ENCOUNTER — PATIENT MESSAGE (OUTPATIENT)
Dept: OTHER | Facility: OTHER | Age: 20
End: 2025-01-26
Payer: MEDICAID

## 2025-01-27 ENCOUNTER — ROUTINE PRENATAL (OUTPATIENT)
Dept: OBSTETRICS AND GYNECOLOGY | Facility: CLINIC | Age: 20
End: 2025-01-27
Payer: MEDICAID

## 2025-01-27 VITALS — SYSTOLIC BLOOD PRESSURE: 128 MMHG | DIASTOLIC BLOOD PRESSURE: 86 MMHG | WEIGHT: 151 LBS | BODY MASS INDEX: 27.62 KG/M2

## 2025-01-27 DIAGNOSIS — Z67.91 RH NEGATIVE STATUS DURING PREGNANCY, THIRD TRIMESTER: Primary | ICD-10-CM

## 2025-01-27 DIAGNOSIS — O26.893 RH NEGATIVE STATUS DURING PREGNANCY, THIRD TRIMESTER: Primary | ICD-10-CM

## 2025-01-27 DIAGNOSIS — O36.5930 MATERNAL CARE FOR OTHER KNOWN OR SUSPECTED POOR FETAL GROWTH, THIRD TRIMESTER, NOT APPLICABLE OR UNSPECIFIED: ICD-10-CM

## 2025-01-27 DIAGNOSIS — Z3A.32 32 WEEKS GESTATION OF PREGNANCY: ICD-10-CM

## 2025-01-27 PROCEDURE — 99213 OFFICE O/P EST LOW 20 MIN: CPT | Mod: TH,,, | Performed by: OBSTETRICS & GYNECOLOGY

## 2025-01-27 NOTE — PROGRESS NOTES
"  Maternal Fetal Medicine Follow Up    Subjective:     Patient ID: [id number]    Chief Complaint: [facility] followup w/us      HPI: Barby Thurman is a 19 y.o. female  at 32w3d gestation with Estimated Date of Delivery: 3/23/25  who is here for follow-up consultation by M.    She had lagging fetal AC measurement on consult and is here to follow-up on fetal growth today.  She had negative cell free DNA with primary OB.       Interval history since last [facility] visit: None.. She denies any leaking fluid, vaginal bleeding, contractions, decreased fetal movement. Denies headaches, visual disturbances, or epigastric pain.    Pregnancy complications include:   Patient Active Problem List   Diagnosis    Rh negative status during pregnancy, third trimester    Pregnancy affected by fetal growth restriction       No changes to medical, surgical, family, social, or obstetric history.    Medications:  Current Outpatient Medications   Medication Instructions    PNV no.[id number]-epa-fish (PRENATAL GUMMIES) 400 mcg-35 mg- 25 mg-5 mg Chew Take by mouth.       Review of Systems   12 point review of systems conducted, negative except as stated in the history of present illness. See HPI for details.      Objective:     Visit Vitals  /76 (BP Location: Left arm, Patient Position: Sitting)   Pulse 89   Ht 5' 2" (1.575 m)   Wt 68.1 kg (150 lb 3.2 oz)   LMP 2024 (Exact Date)   BMI 27.47 kg/m²        Physical Exam  Vitals and nursing note reviewed.   Constitutional:       Appearance: Normal appearance.   HENT:      Head: Normocephalic and atraumatic.      Nose: Nose normal. No congestion.   Cardiovascular:      Rate and Rhythm: Normal rate.   Pulmonary:      Effort: Pulmonary effort is normal.   Skin:     Findings: No rash.   Neurological:      Mental Status: She is alert and oriented to person, place, and time.   Psychiatric:         Mood and Affect: Mood normal.         Behavior: Behavior normal.         Thought Content: " Thought content normal.         Judgment: Judgment normal.         Assessment/Plan:     19 y.o.  female with IUP at 32w3d    Mild fetal growth restriction  There is mild fetal growth restriction with an EFW of 1765 g at the 16% and the AC at the 6%.  on 25.  AFV is normal.   BPP  today, 2025   Normal umbilical artery Doppler today, 2025     I discussed potential etiologies of FGR include but are not limited to normal variation of stature, placental insufficiency, chromosomal abnormalities, genetic disorders, infections, medical conditions, teratogen exposure and other etiologies.  We discussed the increased risk of stillbirth and the potential need for earlier delivery.The potential for constitutional factor as a contributing factor was addressed in addition to other contributing factors such as conditions predisposing to vascular disease such as pregestational diabetes, chronic renal disease, autoimmune disorders; umbilical cord abnormalities; substance use; and multiple gestation. Although uteroplacental insufficiency and/or constitutional factors (if relevant) are the likely etiology, the potential for anomalies not seen with the ultrasound, aneuploidy, or in-utero viral infections are there, but these are very unlikely to be the cause with no other ultrasound findings. Based on a study in 2018, there is an abnormal microarray in 3% of isolated FGR. I discussed and offered genetic amniocentesis, to check for microarray and CMV was offered. The benefits and risks were discussed. She declined amniocentesis . She was advised of need for  evaluation.    She was counseled on Cell free DNA understanding that it is an enhanced screening test but not a diagnostic test. It assesses risk for common aneuploidies such as Trisomy 13, 18, and 21 by evaluating cell-free fetal DNA in maternal circulation with a false positive rate less than 0.5% for Trisomy 21 and less reliable for Trisomy 13  and Trisomy 18. She had cell free DNA that was negative .    Complications of growth-restricted neonates include hypoglycemia, hyperbilirubinemia, hypothermia, seizures, sepsis, IVH, RDS, necrotizing enterocolitis, and  asphyxia. Long-term complications include cognitive delay and adult diseases such as cardiovascular disorders and type 2 diabetes. There is an increased risk (~20%) for recurrence of fetal growth restriction in a future pregnancy.    Because of increased risk of stillbirth, she was advised that in this situation we need to monitor interval fetal growth every 3 weeks and recommend twice weekly fetal testing, including an NST at least once per week, alternating with primary Ob until delivery. She was instructed that fetal testing is not a 100% protective against the risk of fetal demise in-utero. The importance of doing fetal kick counts three times a day and resting in a lateral recumbent position and reporting immediately at any time there is any decreased fetal movement even if the same day of testing was emphasized. Her questions were answered.    Delivery is not indicated at this time, as risks of  mortality and morbidity with delivery, outweigh risks with continued pregnancy. Likewise, with stable condition, steroids are not recommended, as benefit is reaped only if suspected imminent delivery in few days which, although possible, is very unlikely at this time. Plan delivery 38-39 weeks if continued fetal growth and reassuring fetal testing.  Earlier delivery may be needed if worsening fetal growth, abnormal doppler, or abnormal fetal testing or other concerns.    New diagnosis of mild fetal growth restriction patient was counseled on the potential etiologies of fetal growth restriction.  Readdress the options of doing an amniocentesis to check for CMV and chromosomes and microarray analysis.  Patient is not interested in that.  We will plan to start twice weekly fetal testing.   Delivery is not indicated at this time nor is steroids.  The plan of care during pregnancy and long-term implications discussed.  Discussed with Dr. Law who will start doing weekly NSTs early in the week and will do twice weekly fetal testing till delivery.    Follow up in about 1 week (around 2/5/2025) for [facility] Followup, BPP/UAD- Thursday.     Future Appointments   Date Time Provider Department Center   2/6/2025  8:45 AM Randall Lutz MD [facility]   2/6/2025  8:45 AM ROOM 3, [facility]   2/10/2025  9:50 AM Franklyn Law MD [facility]        YUE involvement: Patient was evaluated and examined by Dr. Lutz. JERRY Tan, helped in pre charting of part of note.    This note was created with the assistance of [employer] voice recognition software. There may be transcription errors as a result of using this technology, however minimal. Effort has been made to ensure accuracy of transcription, but any obvious errors or omissions should be clarified with the author of the document.

## 2025-01-29 ENCOUNTER — PROCEDURE VISIT (OUTPATIENT)
Dept: MATERNAL FETAL MEDICINE | Facility: CLINIC | Age: 20
End: 2025-01-29
Payer: MEDICAID

## 2025-01-29 ENCOUNTER — OFFICE VISIT (OUTPATIENT)
Dept: MATERNAL FETAL MEDICINE | Facility: CLINIC | Age: 20
End: 2025-01-29
Payer: MEDICAID

## 2025-01-29 VITALS
WEIGHT: 150.19 LBS | SYSTOLIC BLOOD PRESSURE: 121 MMHG | DIASTOLIC BLOOD PRESSURE: 76 MMHG | HEART RATE: 89 BPM | HEIGHT: 62 IN | BODY MASS INDEX: 27.64 KG/M2

## 2025-01-29 DIAGNOSIS — O36.5990 PREGNANCY AFFECTED BY FETAL GROWTH RESTRICTION: Primary | ICD-10-CM

## 2025-01-29 DIAGNOSIS — O36.5930 MATERNAL CARE FOR OTHER KNOWN OR SUSPECTED POOR FETAL GROWTH, THIRD TRIMESTER, NOT APPLICABLE OR UNSPECIFIED: ICD-10-CM

## 2025-01-29 DIAGNOSIS — O36.1190 ISOIMMUNIZATION FROM BLOOD GROUP INCOMPATIBILITY DURING PREGNANCY: ICD-10-CM

## 2025-01-29 DIAGNOSIS — Z36.89 ENCOUNTER FOR ULTRASOUND TO ASSESS FETAL GROWTH: ICD-10-CM

## 2025-01-29 PROCEDURE — 76816 OB US FOLLOW-UP PER FETUS: CPT | Mod: S$GLB,,, | Performed by: OBSTETRICS & GYNECOLOGY

## 2025-01-29 PROCEDURE — 3008F BODY MASS INDEX DOCD: CPT | Mod: CPTII,S$GLB,, | Performed by: OBSTETRICS & GYNECOLOGY

## 2025-01-29 PROCEDURE — 1159F MED LIST DOCD IN RCRD: CPT | Mod: CPTII,S$GLB,, | Performed by: OBSTETRICS & GYNECOLOGY

## 2025-01-29 PROCEDURE — 1160F RVW MEDS BY RX/DR IN RCRD: CPT | Mod: CPTII,S$GLB,, | Performed by: OBSTETRICS & GYNECOLOGY

## 2025-01-29 PROCEDURE — 99214 OFFICE O/P EST MOD 30 MIN: CPT | Mod: TH,S$GLB,, | Performed by: OBSTETRICS & GYNECOLOGY

## 2025-01-29 PROCEDURE — 3074F SYST BP LT 130 MM HG: CPT | Mod: CPTII,S$GLB,, | Performed by: OBSTETRICS & GYNECOLOGY

## 2025-01-29 PROCEDURE — 76820 UMBILICAL ARTERY ECHO: CPT | Mod: S$GLB,,, | Performed by: OBSTETRICS & GYNECOLOGY

## 2025-01-29 PROCEDURE — 76819 FETAL BIOPHYS PROFIL W/O NST: CPT | Mod: S$GLB,,, | Performed by: OBSTETRICS & GYNECOLOGY

## 2025-01-29 PROCEDURE — 3078F DIAST BP <80 MM HG: CPT | Mod: CPTII,S$GLB,, | Performed by: OBSTETRICS & GYNECOLOGY

## 2025-01-30 DIAGNOSIS — O36.5990 PREGNANCY AFFECTED BY FETAL GROWTH RESTRICTION: Primary | ICD-10-CM

## 2025-01-30 DIAGNOSIS — O36.5930 MATERNAL CARE FOR OTHER KNOWN OR SUSPECTED POOR FETAL GROWTH, THIRD TRIMESTER, NOT APPLICABLE OR UNSPECIFIED: ICD-10-CM

## 2025-01-30 DIAGNOSIS — O36.1190 ISOIMMUNIZATION FROM BLOOD GROUP INCOMPATIBILITY DURING PREGNANCY: ICD-10-CM

## 2025-02-03 NOTE — PROGRESS NOTES
"HPI  19 y.o.  at 33w2d here for OB check and NST due to newly confirmed IUGR>   Reports positive fetal movement. denies LOF, CTX.       ROS  Review of Systems   Constitutional:  Negative for chills and fever.   Gastrointestinal:  Negative for abdominal pain, constipation and diarrhea.   Genitourinary:  Negative for flank pain, genital sores, pelvic pain, urgency, vaginal bleeding, vaginal discharge, vaginal pain, postcoital bleeding and vaginal odor.         OBJECTIVE  /80 (BP Location: Left arm, Patient Position: Sitting)   Ht 5' 2" (1.575 m)   Wt 69.9 kg (154 lb)   LMP 2024 (Exact Date)   BMI 28.17 kg/m²     Gen: No distress  Abdomen: Gravid, non-tender  Extremities: No edema  NST:   - Baseline: 140s  - Variability: moderate  - Accelerations: PRESENT  - Decelerations: ABSENT  - Time on monitor: 20 mins  - Category: .reactive, category 1    [place]:   - CTX: ABSENT        ASSESSMENT  1. Maternal care for other known or suspected poor fetal growth, third trimester, not applicable or unspecified  - Fetal non-stress test  - POCT Urinalysis, Dipstick, Automated, W/O Scope    2. 33 weeks gestation of pregnancy  - Fetal non-stress test  - POCT Urinalysis, Dipstick, Automated, W/O Scope    3. Rh negative status during pregnancy, third trimester  - Fetal non-stress test        PLAN  Reviewed standard labor unit and kick count precautions.  Discussed pre-eclampsia precautions.  Discussed COVID-19 risks, social distancing, and isolation if respiratory symptoms develop.     MFM note 25 reviewed:   There is mild fetal growth restriction with an EFW of 1765 g at the 16% and the AC at the 6%.  on 25.  AFV is normal.   BPP 8/8 today, 2025   Normal umbilical artery Doppler today, 2025   plan to start twice weekly fetal testing.   Plan delivery 38-39 weeks if continued fetal growth and reassuring fetal testing.   Keep MFM f/u appt     Strict HUBERT TID  Report to JEAN for failed HUBERT (< 10 " movements per hour)    RTC NST    This note was transcribed by Adela Hoffman. There may be transcription errors as a result, however minimal. Effort has been made to ensure accuracy of transcription, but any obvious errors or omissions should be clarified with the author of the document.       I agree with the above documentation.

## 2025-02-04 ENCOUNTER — CLINICAL SUPPORT (OUTPATIENT)
Dept: OBSTETRICS AND GYNECOLOGY | Facility: CLINIC | Age: 20
End: 2025-02-04
Payer: MEDICAID

## 2025-02-04 VITALS
SYSTOLIC BLOOD PRESSURE: 122 MMHG | DIASTOLIC BLOOD PRESSURE: 80 MMHG | WEIGHT: 154 LBS | HEIGHT: 62 IN | BODY MASS INDEX: 28.34 KG/M2

## 2025-02-04 DIAGNOSIS — Z67.91 RH NEGATIVE STATUS DURING PREGNANCY, THIRD TRIMESTER: ICD-10-CM

## 2025-02-04 DIAGNOSIS — O36.5930 MATERNAL CARE FOR OTHER KNOWN OR SUSPECTED POOR FETAL GROWTH, THIRD TRIMESTER, NOT APPLICABLE OR UNSPECIFIED: Primary | ICD-10-CM

## 2025-02-04 DIAGNOSIS — O26.893 RH NEGATIVE STATUS DURING PREGNANCY, THIRD TRIMESTER: ICD-10-CM

## 2025-02-04 DIAGNOSIS — Z3A.33 33 WEEKS GESTATION OF PREGNANCY: ICD-10-CM

## 2025-02-04 PROCEDURE — 99214 OFFICE O/P EST MOD 30 MIN: CPT | Mod: TH,25,, | Performed by: OBSTETRICS & GYNECOLOGY

## 2025-02-04 PROCEDURE — 59025 FETAL NON-STRESS TEST: CPT | Mod: ,,, | Performed by: OBSTETRICS & GYNECOLOGY

## 2025-02-06 ENCOUNTER — PROCEDURE VISIT (OUTPATIENT)
Dept: MATERNAL FETAL MEDICINE | Facility: CLINIC | Age: 20
End: 2025-02-06
Payer: MEDICAID

## 2025-02-06 DIAGNOSIS — O36.5990 PREGNANCY AFFECTED BY FETAL GROWTH RESTRICTION: ICD-10-CM

## 2025-02-06 DIAGNOSIS — O36.1190 ISOIMMUNIZATION FROM BLOOD GROUP INCOMPATIBILITY DURING PREGNANCY: ICD-10-CM

## 2025-02-06 PROCEDURE — 76820 UMBILICAL ARTERY ECHO: CPT | Mod: S$GLB,,, | Performed by: OBSTETRICS & GYNECOLOGY

## 2025-02-06 PROCEDURE — 76819 FETAL BIOPHYS PROFIL W/O NST: CPT | Mod: S$GLB,,, | Performed by: OBSTETRICS & GYNECOLOGY

## 2025-02-07 NOTE — PROGRESS NOTES
"HPI  19 y.o.  at 34w1d here for OB check, NST.  Reports positive fetal movement. denies LOF, CTX.   BP today in clinic 142/86, 132/84, 136/86 . Negative Pre-e ROS.       ROS  Review of Systems   Constitutional:  Negative for chills and fever.   Gastrointestinal:  Negative for abdominal pain, constipation and diarrhea.   Genitourinary:  Negative for flank pain, genital sores, pelvic pain, urgency, vaginal bleeding, vaginal discharge, vaginal pain, postcoital bleeding and vaginal odor.         OBJECTIVE  /86 (BP Location: Right arm, Patient Position: Sitting)   Ht 5' 4" (1.626 m)   Wt 69.9 kg (154 lb)   LMP 2024 (Exact Date)   BMI 26.43 kg/m²     Gen: No distress  Abdomen: Gravid, non-tender  Extremities: No edema  NST:   - Baseline: 130  - Variability: moderate  - Accelerations: PRESENT: 20X20   - Decelerations: ABSENT  - Time on monitor: 20mins  - Category: .reactive, category 1    [place]:   - CTX: ABSENT        ASSESSMENT  1. Maternal care for other known or suspected poor fetal growth, third trimester, not applicable or unspecified  - Fetal non-stress test- Today    2. 34 weeks gestation of pregnancy  - Fetal non-stress test- Today  - POCT Urinalysis, Dipstick, Automated, W/O Scope    3. Rh negative status during pregnancy, third trimester    4. Elevated blood pressure affecting pregnancy in third trimester, antepartum  - Uric Acid; Future  - Lactate Dehydrogenase; Future  - Comprehensive Metabolic Panel; Future  - CBC Auto Differential; Future  - Protein/Creatinine Ratio, Urine        PLAN    MFM note 25 reviewed:   There is mild fetal growth restriction with an EFW of 1765 g at the 16% and the AC at the 6%.  on 25.  AFV is normal.   BPP 8/8 today, 2025   Normal umbilical artery Doppler today, 2025   plan to start twice weekly fetal testing.   Plan delivery 38-39 weeks if continued fetal growth and reassuring fetal testing.   Keep MFM f/u appt      Strict HUBERT TID  Report " to JEAN for failed HUBERT (< 10 movements per hour)    Initial BP mild range.  Rpt blood pressures were normal but borderline.  Pre-e precautions given.  Will send to [facility] for Pre-e labs today.     RTC NST 1 week     This note was transcribed by Adela Hoffman. There may be transcription errors as a result, however minimal. Effort has been made to ensure accuracy of transcription, but any obvious errors or omissions should be clarified with the author of the document.       I agree with the above documentation.

## 2025-02-10 ENCOUNTER — LAB VISIT (OUTPATIENT)
Dept: LAB | Facility: HOSPITAL | Age: 20
End: 2025-02-10
Attending: OBSTETRICS & GYNECOLOGY
Payer: MEDICAID

## 2025-02-10 ENCOUNTER — CLINICAL SUPPORT (OUTPATIENT)
Dept: OBSTETRICS AND GYNECOLOGY | Facility: CLINIC | Age: 20
End: 2025-02-10
Payer: MEDICAID

## 2025-02-10 VITALS
DIASTOLIC BLOOD PRESSURE: 86 MMHG | HEIGHT: 64 IN | BODY MASS INDEX: 26.29 KG/M2 | WEIGHT: 154 LBS | SYSTOLIC BLOOD PRESSURE: 136 MMHG

## 2025-02-10 DIAGNOSIS — Z3A.34 34 WEEKS GESTATION OF PREGNANCY: ICD-10-CM

## 2025-02-10 DIAGNOSIS — O16.3 ELEVATED BLOOD PRESSURE AFFECTING PREGNANCY IN THIRD TRIMESTER, ANTEPARTUM: ICD-10-CM

## 2025-02-10 DIAGNOSIS — O36.5930 MATERNAL CARE FOR OTHER KNOWN OR SUSPECTED POOR FETAL GROWTH, THIRD TRIMESTER, NOT APPLICABLE OR UNSPECIFIED: Primary | ICD-10-CM

## 2025-02-10 DIAGNOSIS — Z67.91 RH NEGATIVE STATUS DURING PREGNANCY, THIRD TRIMESTER: ICD-10-CM

## 2025-02-10 DIAGNOSIS — O26.893 RH NEGATIVE STATUS DURING PREGNANCY, THIRD TRIMESTER: ICD-10-CM

## 2025-02-10 LAB
ALBUMIN SERPL-MCNC: 3.7 G/DL (ref 3.4–5)
ALBUMIN/GLOB SERPL: 1.2 RATIO
ALP SERPL-CCNC: 116 UNIT/L (ref 50–144)
ALT SERPL-CCNC: 11 UNIT/L (ref 1–45)
ANION GAP SERPL CALC-SCNC: 3 MEQ/L (ref 2–13)
AST SERPL-CCNC: 21 UNIT/L (ref 14–36)
BASOPHILS # BLD AUTO: 0.02 X10(3)/MCL (ref 0.01–0.08)
BASOPHILS NFR BLD AUTO: 0.3 % (ref 0.1–1.2)
BILIRUB SERPL-MCNC: 0.4 MG/DL (ref 0–1)
BILIRUB UR QL STRIP: NORMAL
BUN SERPL-MCNC: 3 MG/DL (ref 7–20)
CALCIUM SERPL-MCNC: 9.3 MG/DL (ref 8.4–10.2)
CHLORIDE SERPL-SCNC: 106 MMOL/L (ref 98–110)
CO2 SERPL-SCNC: 27 MMOL/L (ref 21–32)
CREAT SERPL-MCNC: 0.45 MG/DL (ref 0.66–1.25)
CREAT/UREA NIT SERPL: 7 (ref 12–20)
EOSINOPHIL # BLD AUTO: 0.07 X10(3)/MCL (ref 0.04–0.36)
EOSINOPHIL NFR BLD AUTO: 1 % (ref 0.7–7)
ERYTHROCYTE [DISTWIDTH] IN BLOOD BY AUTOMATED COUNT: 14.5 % (ref 11–14.5)
GFR SERPLBLD CREATININE-BSD FMLA CKD-EPI: >90 ML/MIN/1.73/M2
GLOBULIN SER-MCNC: 3 GM/DL (ref 2–3.9)
GLUCOSE SERPL-MCNC: 79 MG/DL (ref 70–115)
GLUCOSE UR QL STRIP: NEGATIVE
HCT VFR BLD AUTO: 33.6 % (ref 36–48)
HGB BLD-MCNC: 10.8 G/DL (ref 11.8–16)
IMM GRANULOCYTES # BLD AUTO: 0.03 X10(3)/MCL (ref 0–0.03)
IMM GRANULOCYTES NFR BLD AUTO: 0.4 % (ref 0–0.5)
KETONES UR QL STRIP: NORMAL
LDH SERPL-CCNC: 162 U/L (ref 120–246)
LEUKOCYTE ESTERASE UR QL STRIP: NEGATIVE
LYMPHOCYTES # BLD AUTO: 1.13 X10(3)/MCL (ref 1.16–3.74)
LYMPHOCYTES NFR BLD AUTO: 16 % (ref 20–55)
MCH RBC QN AUTO: 29 PG (ref 27–34)
MCHC RBC AUTO-ENTMCNC: 32.1 G/DL (ref 31–37)
MCV RBC AUTO: 90.3 FL (ref 79–99)
MONOCYTES # BLD AUTO: 0.65 X10(3)/MCL (ref 0.24–0.36)
MONOCYTES NFR BLD AUTO: 9.2 % (ref 4.7–12.5)
NEUTROPHILS # BLD AUTO: 5.16 X10(3)/MCL (ref 1.56–6.13)
NEUTROPHILS NFR BLD AUTO: 73.1 % (ref 37–73)
NRBC BLD AUTO-RTO: 0 %
PH, POC UA: NORMAL
PLATELET # BLD AUTO: 219 X10(3)/MCL (ref 140–371)
PMV BLD AUTO: 10.3 FL (ref 9.4–12.4)
POC BLOOD, URINE: NORMAL
POC NITRATES, URINE: NEGATIVE
POTASSIUM SERPL-SCNC: 3.9 MMOL/L (ref 3.5–5.1)
PROT SERPL-MCNC: 6.7 GM/DL (ref 6.3–8.2)
PROT UR QL STRIP: NEGATIVE
RBC # BLD AUTO: 3.72 X10(6)/MCL (ref 4–5.1)
SODIUM SERPL-SCNC: 136 MMOL/L (ref 136–145)
SP GR UR STRIP: NORMAL (ref 1–1.03)
URATE SERPL-MCNC: 4 MG/DL (ref 2.6–7.2)
UROBILINOGEN UR STRIP-ACNC: NORMAL (ref 0.3–2.2)
WBC # BLD AUTO: 7.06 X10(3)/MCL (ref 4–11.5)

## 2025-02-10 PROCEDURE — 99214 OFFICE O/P EST MOD 30 MIN: CPT | Mod: TH,25,, | Performed by: OBSTETRICS & GYNECOLOGY

## 2025-02-10 PROCEDURE — 83615 LACTATE (LD) (LDH) ENZYME: CPT

## 2025-02-10 PROCEDURE — 36415 COLL VENOUS BLD VENIPUNCTURE: CPT

## 2025-02-10 PROCEDURE — 84550 ASSAY OF BLOOD/URIC ACID: CPT

## 2025-02-10 PROCEDURE — 85025 COMPLETE CBC W/AUTO DIFF WBC: CPT

## 2025-02-10 PROCEDURE — 80053 COMPREHEN METABOLIC PANEL: CPT

## 2025-02-10 PROCEDURE — 59025 FETAL NON-STRESS TEST: CPT | Mod: ,,, | Performed by: OBSTETRICS & GYNECOLOGY

## 2025-02-11 NOTE — PROGRESS NOTES
"  Maternal Fetal Medicine Follow Up    Subjective:     Patient ID: [id number]    Chief Complaint: [facility] follow up with US      HPI: Barby Thurman is a 19 y.o. female  at 34w4d gestation with Estimated Date of Delivery: 3/23/25  who is here for follow-up consultation by [facility].    She has mild fetal growth restriction noted on 2025..  She had negative cell free DNA with primary OB.  She was noted to have elevated blood pressure with primary OB on 02/10/2025.  She had labs on 02/10/2025 that were as follows: Platelets 219 K, serum creatinine 0.45, AST 21, ALT 11, uric acid 4,        Interval history since last [facility] visit: None.. She denies any leaking fluid, vaginal bleeding, contractions, decreased fetal movement. Denies headaches, visual disturbances, or epigastric pain.    Pregnancy complications include:   Patient Active Problem List   Diagnosis    Rh negative status during pregnancy, third trimester    Pregnancy affected by fetal growth restriction    Elevated blood pressure affecting pregnancy in third trimester, antepartum       No changes to medical, surgical, family, social, or obstetric history.    Medications:  Current Outpatient Medications   Medication Instructions    PNV no.[id number]-epa-fish (PRENATAL GUMMIES) 400 mcg-35 mg- 25 mg-5 mg Chew Take by mouth.       Review of Systems   12 point review of systems conducted, negative except as stated in the history of present illness. See HPI for details.      Objective:     Visit Vitals  /88 (BP Location: Left arm, Patient Position: Sitting)   Pulse 98   Ht 5' 4" (1.626 m)   Wt 69.9 kg (154 lb)   LMP 2024 (Exact Date)   BMI 26.43 kg/m²        Physical Exam  Vitals and nursing note reviewed.   Constitutional:       Appearance: Normal appearance.   HENT:      Head: Normocephalic and atraumatic.   Cardiovascular:      Rate and Rhythm: Normal rate and regular rhythm.   Pulmonary:      Effort: Pulmonary effort is normal. No " respiratory distress.      Breath sounds: Normal breath sounds.   Abdominal:      Palpations: Abdomen is soft.      Tenderness: There is no abdominal tenderness.   Musculoskeletal:      Right lower leg: No edema.      Left lower leg: No edema.   Skin:     General: Skin is warm and dry.   Neurological:      Mental Status: She is alert and oriented to person, place, and time.      Deep Tendon Reflexes: Reflexes normal.   Psychiatric:         Mood and Affect: Mood normal.         Behavior: Behavior normal.         Thought Content: Thought content normal.         Judgment: Judgment normal.         Assessment/Plan:     19 y.o.  female with IUP at 34w4d    Mild fetal growth restriction  There was mild fetal growth restriction with an EFW of 1765 g at the 16% and the AC at the 6%  on 25.  AFV is normal.   BPP  today, 2025   Normal umbilical artery Doppler today, 2025     She was previously offered and declined amniocentesis .  She had negative cell free DNA.  She was advised of need for  evaluation.    Complications of growth-restricted neonates include hypoglycemia, hyperbilirubinemia, hypothermia, seizures, sepsis, IVH, RDS, necrotizing enterocolitis, and  asphyxia. Long-term complications include cognitive delay and adult diseases such as cardiovascular disorders and type 2 diabetes. There is an increased risk (~20%) for recurrence of fetal growth restriction in a future pregnancy.    Because of increased risk of stillbirth, we will monitor interval fetal growth every 3 weeks and recommend twice weekly fetal testing, including an NST at least once per week, alternating with primary Ob until delivery.  Reviewed that fetal testing is not a 100% protective against the risk of fetal demise in-utero. The importance of doing fetal kick counts three times a day and resting in a lateral recumbent position and reporting immediately at any time there is any decreased fetal movement even  if the same day of testing was also reviewed.    Delivery is not indicated at this time, as risks of  mortality and morbidity with delivery, outweigh risks with continued pregnancy. Likewise, with stable condition, steroids are not recommended, as benefit is reaped only if suspected imminent delivery in few days which, although possible, is very unlikely at this time. Plan delivery 38-39 weeks if continued fetal growth and reassuring fetal testing.  Earlier delivery may be needed if worsening fetal growth, abnormal doppler, abnormal fetal testing, or other concerns.      Previous elevated blood pressure  Vitals:    25 0849   BP: 127/88      Patient is asymptomatic and has normal BP today.  Advised her that if blood pressure is persistently elevated it maybe concerning for development of gestational hypertension/preeclampsia. If confirmed gestational HTN, please let our office know upon confirmation as she would need earlier delivery than mentioned above. Otherwise, recommend continued close followup with fetal testing as above at this time.   Preeclampsia warnings were given with instructions to come in immediately if she has any headaches, vision problems, epigastric pain, or decreased fetal movement at any time. Answered all questions and she v/u.       Follow up in about 1 week (around 2025) for MFM Followup, BPP/UAD, Repeat Ultrasound.     Future Appointments   Date Time Provider Department Center   2025  9:30 AM Franklyn Law MD [facility]      JERRY Bhakta      This note was created with the assistance of [employer] voice recognition software. There may be transcription errors as a result of using this technology, however minimal. Effort has been made to ensure accuracy of transcription, but any obvious errors or omissions should be clarified with the author of the document.

## 2025-02-13 ENCOUNTER — OFFICE VISIT (OUTPATIENT)
Dept: MATERNAL FETAL MEDICINE | Facility: CLINIC | Age: 20
End: 2025-02-13
Payer: MEDICAID

## 2025-02-13 ENCOUNTER — PROCEDURE VISIT (OUTPATIENT)
Dept: MATERNAL FETAL MEDICINE | Facility: CLINIC | Age: 20
End: 2025-02-13
Payer: MEDICAID

## 2025-02-13 VITALS
SYSTOLIC BLOOD PRESSURE: 127 MMHG | HEIGHT: 64 IN | BODY MASS INDEX: 26.29 KG/M2 | HEART RATE: 98 BPM | WEIGHT: 154 LBS | DIASTOLIC BLOOD PRESSURE: 88 MMHG

## 2025-02-13 DIAGNOSIS — O36.5990 PREGNANCY AFFECTED BY FETAL GROWTH RESTRICTION: Primary | ICD-10-CM

## 2025-02-13 DIAGNOSIS — Z36.89 ENCOUNTER FOR ULTRASOUND TO ASSESS FETAL GROWTH: ICD-10-CM

## 2025-02-13 DIAGNOSIS — O36.5930 MATERNAL CARE FOR OTHER KNOWN OR SUSPECTED POOR FETAL GROWTH, THIRD TRIMESTER, NOT APPLICABLE OR UNSPECIFIED: ICD-10-CM

## 2025-02-13 DIAGNOSIS — O36.1190 ISOIMMUNIZATION FROM BLOOD GROUP INCOMPATIBILITY DURING PREGNANCY: ICD-10-CM

## 2025-02-13 DIAGNOSIS — O36.5990 PREGNANCY AFFECTED BY FETAL GROWTH RESTRICTION: ICD-10-CM

## 2025-02-13 PROCEDURE — 3079F DIAST BP 80-89 MM HG: CPT | Mod: CPTII,S$GLB,, | Performed by: NURSE PRACTITIONER

## 2025-02-13 PROCEDURE — 99213 OFFICE O/P EST LOW 20 MIN: CPT | Mod: TH,S$GLB,, | Performed by: NURSE PRACTITIONER

## 2025-02-13 PROCEDURE — 3074F SYST BP LT 130 MM HG: CPT | Mod: CPTII,S$GLB,, | Performed by: NURSE PRACTITIONER

## 2025-02-13 PROCEDURE — 76820 UMBILICAL ARTERY ECHO: CPT | Mod: S$GLB,,, | Performed by: OBSTETRICS & GYNECOLOGY

## 2025-02-13 PROCEDURE — 76819 FETAL BIOPHYS PROFIL W/O NST: CPT | Mod: S$GLB,,, | Performed by: OBSTETRICS & GYNECOLOGY

## 2025-02-13 PROCEDURE — 1159F MED LIST DOCD IN RCRD: CPT | Mod: CPTII,S$GLB,, | Performed by: NURSE PRACTITIONER

## 2025-02-13 PROCEDURE — 1160F RVW MEDS BY RX/DR IN RCRD: CPT | Mod: CPTII,S$GLB,, | Performed by: NURSE PRACTITIONER

## 2025-02-13 PROCEDURE — 3008F BODY MASS INDEX DOCD: CPT | Mod: CPTII,S$GLB,, | Performed by: NURSE PRACTITIONER

## 2025-02-14 NOTE — PROGRESS NOTES
History and Physical      Chief Complaint:  Routine Prenatal Visit (NST//88)    History of Present Illness:  Barby Thurman is a 19 y.o. year old  at 35w1d presents for preadmit ob, NST. JHONATHAN 3/23/25 .  Positive fetal movement. Denies LOF or contractions.    Pre-e labs 2/10/25  BP today in clinic 142/88, 140/84  Negative preeclampsia ROS.      Review of Systems:  General/Constitutional: Fever denies .    Skin: Rash denies.   Respiratory: Cough denies. SOB denies. Wheezing denies .   Cardiovascular: Chest pain denies. Dizziness denies. Palpitations denies. Swelling in hands/feet denies    Gastrointestinal: Abdominal pain denies. Constipation denies. Diarrhea denies. Heartburn denies. Nausea denies. Vomiting denies    Genitourinary: Painful urination denies.   Gynecologic: Vaginal bleeding denies. Vaginal discharge Normal. Leaking amniotic fluid denies. Contractions denies    Psychiatric: Depressed mood denies. Suicidal thoughts denies.     OB History    Para Term  AB Living   1 0 0 0 0 0   SAB IAB Ectopic Multiple Live Births   0 0 0 0 0      # 1 - Date: None, Sex: None, Weight: None, GA: None, Type: None, Apgar1: None, Apgar5: None, Living: None, Birth Comments: None      Past Medical History:   Diagnosis Date    Rh incompatibility     O-Neg       Current Outpatient Medications:     PNV no.557-IS-bz3-dha-epa-fish (PRENATAL GUMMIES) 400 mcg-35 mg- 25 mg-5 mg Chew, Take by mouth., Disp: , Rfl:     Review of patient's allergies indicates:  No Known Allergies    Past Surgical History:   Procedure Laterality Date    WISDOM TOOTH EXTRACTION  2020    All four were removed     Family History   Problem Relation Name Age of Onset    Breast cancer Maternal Grandmother MGG     Colon cancer Neg Hx      Ovarian cancer Neg Hx      Uterine cancer Neg Hx      Cervical cancer Neg Hx       Social History     Socioeconomic History    Marital status: Single   Tobacco Use    Smoking status: Never      "Passive exposure: Current    Smokeless tobacco: Never   Substance and Sexual Activity    Alcohol use: Never    Drug use: Never    Sexual activity: Yes     Partners: Male     Comment: STI: CZ       Physical Exam:  BP (!) 142/88 (BP Location: Right arm, Patient Position: Sitting)   Ht 5' 4" (1.626 m)   Wt 69.9 kg (154 lb)   LMP 06/16/2024 (Exact Date)   BMI 26.43 kg/m²       Chaperone present      Gen: No distress  Abdomen: Gravid, non-tender  Extremities: No edema  Pelvis: NEFG  Cervix: 1/0/-3    NST:   - Baseline: 140  - Variability: moderate  - Accelerations: PRESENT  - Decelerations: 1 variable decel  - Time on monitor: 20mins  - Category: cat 2    [place]:   - CTX: ABSENT          ABO/Rh:   Lab Results   Component Value Date/Time    GROUPTRH O NEG 01/10/2025 09:44 AM    ABORH O NEG 08/30/2024 03:45 PM    ABSCREEN POS (A) 08/30/2024 03:45 PM     H&H:  Lab Results   Component Value Date/Time    HGB 10.8 (L) 02/10/2025 02:13 PM    HCT 33.6 (L) 02/10/2025 02:13 PM     Platelet:  Lab Results   Component Value Date/Time     02/10/2025 02:13 PM     [place]ivan:   Lab Results   Component Value Date/Time    TRRF0TC 103 01/10/2025 10:48 AM     HIV:   Lab Results   Component Value Date/Time    HIV Nonreactive 08/30/2024 03:45 PM     RPR:  Lab Results   Component Value Date/Time    SYPHAB Nonreactive 01/10/2025 09:44 AM     Hepatitis B Surface Antigen:  Lab Results   Component Value Date/Time    HEPBSAG Negative 08/30/2024 03:45 PM     Hepatitis C:  Lab Results   Component Value Date/Time    HEPCAB Nonreactive 08/30/2024 03:45 PM     Rubella Immune Status:  Lab Results   Component Value Date/Time    RUBELLAIGG 49.00 08/30/2024 03:45 PM     Chlamydia:  Lab Results   Component Value Date/Time    CTRNA Negative 01/17/2024 10:03 AM     Gonorrhea:  Lab Results   Component Value Date/Time    FACVSPECIMEN Negative 01/17/2024 10:03 AM      Trichomoniasis:No results found for: "TRVGRNA"   Last PAP Date: " 8/20/2024      Assessment/Plan:  1. Maternal care for other known or suspected poor fetal growth, third trimester, not applicable or unspecified  -     Fetal non-stress test- Today  -     Admit to Inpatient; Standing  -     Full code; Standing  -     Vital Signs; Standing  -     Vital signs- Early Labor(0-4 cm); Standing  -     Vital Signs- Active Labor (4-10 cm); Standing  -     Vital Signs Post Delivery; Standing  -     Check Temperature, Membranes Intact; Standing  -     Check Temperature, Membranes Ruptured; Standing  -     Pulse Oximetry Continous while CONTINUOUS electronic fetal Monitor in use; Standing  -     Cervical Exam; Standing  -     Fetal / Uterine Monitoring - Continuous; Standing  -     Fetal monitoring - scalp electrode; Standing  -     Insert peripheral IV; Standing  -     Lozoya to Gravity; Standing  -     Lozoya Catheter Care every 12 hours; Standing  -     Nurse to discontinue lozoya when patient no longer meets criteria; Standing  -     Post Lozoya Catheter Removal Protocol; Standing  -     Perform Bladder scan; Standing  -     Perform Intermittent Catheterization; Standing  -     Perform Bladder Scan, post intermittent cath; Standing  -     Place indwelling catheter; Standing  -     Watch for excessive vaginal bleeding; Standing  -     Chlorhexidine Bath; Standing  -     Decrease Oxytocin; Standing  -     Discontinue oxytocin; Standing  -     Oxytocin Titration Resumption; Standing  -     Amnioinfusion PRN recurrent variable decelerations; Standing  -     Administer a 500 -1000 ml IV fluid bolus as needed for; Standing  -     Assess fundal height/uterine firmness, lochia, perineum; Standing  -     Check Temperature Post Delivery; Standing  -     Notify Physician; Standing  -     Notify OB care provider; Standing  -     Notify physician of cervical change or lack of change; Standing  -     Notify physician for excessive uterine activity; Standing  -     Notify physician for Fetal Heart Tones  consistent with Category II or Category III tracing; Standing  -     Notify Pediatrician after delivery; Standing  -     Notify Nursery / Level II Nursery; Standing  -     Notify Nursery / Level II Nursery; Standing  -     Abdominal prep for  Section; Standing  -     Chlorhexidine (CHG) 2% Wipes; Standing  -     Place sequential compression device; Standing  -     Chlorohexidine Gluconate Bath; Standing  -     Vital signs every 15 minutes; Standing  -     Saline lock IV; Standing  -     Perform baseline 20 minute non stress test - proceed with other orders if reactive criteria met.; Standing  -     miSOPROStol split tablet 25 mcg  -     Maternal vital signs, fetal and uterine assessments after each dose and hourly; Standing  -     Electronic fetal monitoring; Standing  -     May ambulate 30 minutes after insertion of Misoprostol (Cytotec); Standing  -     Withhold Misoprostol (Cytotec) dosing:; Standing  -     Oxytocin should be delayed until at least 4 hours after the last Misoprostol (Cytotec) dose administered; Standing  -     Vital signs every 15 minutes; Standing  -     miSOPROStoL tablet 800 mcg  -     miSOPROStoL tablet 800 mcg  -     Urinalysis, Reflex to Urine Culture; Standing  -     CBC with Auto Differential; Standing  -     Comprehensive metabolic panel; Standing  -     Type & Screen; Standing  -     SYPHILIS ANTIBODY (WITH REFLEX RPR); Standing  -     HIV 1/2 Ag/Ab (4th Gen); Standing  -     Hepatitis B surface antigen; Standing  -     POCT Cord Blood Gas Umbilical Artery Cord Gas; Standing  -     POCT Cord Blood Gas Umbilical Vein Cord Gas; Standing  -     RAPID HIV; Standing  -     CBC auto differential; Standing  -     Type & Screen; Standing  -     Rubella antibody, IgG; Standing  -     Hepatitis B surface antigen; Standing  -     Inpatient consult to Anesthesiology; Standing  -     Diet Clear Liquid; Standing    2. 35 weeks gestation of pregnancy  -     POCT Urinalysis, Dipstick,  Automated, W/O Scope  -     Fetal non-stress test- Today    3. Rh negative status during pregnancy, third trimester  -     Admit to Inpatient; Standing  -     Full code; Standing  -     Vital Signs; Standing  -     Vital signs- Early Labor(0-4 cm); Standing  -     Vital Signs- Active Labor (4-10 cm); Standing  -     Vital Signs Post Delivery; Standing  -     Check Temperature, Membranes Intact; Standing  -     Check Temperature, Membranes Ruptured; Standing  -     Pulse Oximetry Continous while CONTINUOUS electronic fetal Monitor in use; Standing  -     Cervical Exam; Standing  -     Fetal / Uterine Monitoring - Continuous; Standing  -     Fetal monitoring - scalp electrode; Standing  -     Insert peripheral IV; Standing  -     Lozoya to Gravity; Standing  -     Lozoya Catheter Care every 12 hours; Standing  -     Nurse to discontinue lozyoa when patient no longer meets criteria; Standing  -     Post Lozoya Catheter Removal Protocol; Standing  -     Perform Bladder scan; Standing  -     Perform Intermittent Catheterization; Standing  -     Perform Bladder Scan, post intermittent cath; Standing  -     Place indwelling catheter; Standing  -     Watch for excessive vaginal bleeding; Standing  -     Chlorhexidine Bath; Standing  -     Decrease Oxytocin; Standing  -     Discontinue oxytocin; Standing  -     Oxytocin Titration Resumption; Standing  -     Amnioinfusion PRN recurrent variable decelerations; Standing  -     Administer a 500 -1000 ml IV fluid bolus as needed for; Standing  -     Assess fundal height/uterine firmness, lochia, perineum; Standing  -     Check Temperature Post Delivery; Standing  -     Notify Physician; Standing  -     Notify OB care provider; Standing  -     Notify physician of cervical change or lack of change; Standing  -     Notify physician for excessive uterine activity; Standing  -     Notify physician for Fetal Heart Tones consistent with Category II or Category III tracing; Standing  -      Notify Pediatrician after delivery; Standing  -     Notify Nursery / Level II Nursery; Standing  -     Notify Nursery / Level II Nursery; Standing  -     Abdominal prep for  Section; Standing  -     Chlorhexidine (CHG) 2% Wipes; Standing  -     Place sequential compression device; Standing  -     Chlorohexidine Gluconate Bath; Standing  -     Vital signs every 15 minutes; Standing  -     Saline lock IV; Standing  -     Perform baseline 20 minute non stress test - proceed with other orders if reactive criteria met.; Standing  -     miSOPROStol split tablet 25 mcg  -     Maternal vital signs, fetal and uterine assessments after each dose and hourly; Standing  -     Electronic fetal monitoring; Standing  -     May ambulate 30 minutes after insertion of Misoprostol (Cytotec); Standing  -     Withhold Misoprostol (Cytotec) dosing:; Standing  -     Oxytocin should be delayed until at least 4 hours after the last Misoprostol (Cytotec) dose administered; Standing  -     Vital signs every 15 minutes; Standing  -     miSOPROStoL tablet 800 mcg  -     miSOPROStoL tablet 800 mcg  -     Urinalysis, Reflex to Urine Culture; Standing  -     CBC with Auto Differential; Standing  -     Comprehensive metabolic panel; Standing  -     Type & Screen; Standing  -     SYPHILIS ANTIBODY (WITH REFLEX RPR); Standing  -     HIV 1/2 Ag/Ab (4th Gen); Standing  -     Hepatitis B surface antigen; Standing  -     POCT Cord Blood Gas Umbilical Artery Cord Gas; Standing  -     POCT Cord Blood Gas Umbilical Vein Cord Gas; Standing  -     RAPID HIV; Standing  -     CBC auto differential; Standing  -     Type & Screen; Standing  -     Rubella antibody, IgG; Standing  -     Hepatitis B surface antigen; Standing  -     Inpatient consult to Anesthesiology; Standing  -     Diet Clear Liquid; Standing    4. Gestational hypertension, third trimester  -     Protein, 24 Hr Urine; Future  -     CBC Auto Differential; Future; Expected date: 2025  -      Comprehensive Metabolic Panel; Future; Expected date: 02/17/2025  -     Lactate Dehydrogenase; Future; Expected date: 02/17/2025  -     Uric Acid; Future; Expected date: 05/17/2025    Other orders  -     Progressive Mobility Protocol (mobilize patient to their highest level of functioning at least twice daily); Standing  -     Ambulate; Standing  -     Dangle at bedside; Standing  -     Change position, roll left or right; Standing  -     lactated ringers bolus 1,000 mL  -     lactated ringers bolus 500 mL  -     lactated ringers infusion  -     0.9% NaCl infusion  -     IP VTE LOW RISK PATIENT; Standing  -     mupirocin 2 % ointment  -     terbutaline injection 0.25 mg  -     oxytocin 15 units/250 mL (60 milliunits/mL) in 0.9% NaCl IV bolus from bag  -     oxytocin 15 units/250 mL (60 milliunits/mL) in 0.9% NaCl infusion (non-titrating)  -     penicillin G potassium 5 Million Units in D5W 100 mL IVPB (MB+)  -     penicillin G potassium 3 million unit/50 mL IVPB 3 Million Units  -     cefazolin (ANCEF) 2 gram in dextrose 5% 50 mL IVPB (premix)  -     ceFAZolin (ANCEF) 1 gram in dextrose 5 % 50 mL IVPB (premix)  -     cefazolin (ANCEF) 2 gram in dextrose 5% 50 mL IVPB (premix)  -     ceFAZolin 3 g in D5W 100 mL IVPB (MB+)  -     azithromycin (ZITHROMAX) 500 mg in 0.9% NaCl 250 mL IVPB (admixture device)  -     ondansetron disintegrating tablet 8 mg  -     promethazine tablet 25 mg  -     acetaminophen tablet 650 mg  -     butorphanol injection 1 mg  -     butorphanol injection 2 mg  -     calcium carbonate 200 mg calcium (500 mg) chewable tablet 500 mg  -     simethicone chewable tablet 80 mg  -     LIDOcaine HCL 10 mg/ml (1%) injection 10 mL  -     oxytocin 15 units/250 mL (60 milliunits/mL) in 0.9% NaCl IV bolus from bag  -     oxytocin 15 units/250 mL (60 milliunits/mL) in 0.9% NaCl infusion (non-titrating)  -     oxytocin injection 10 Units  -     methylergonovine injection 200 mcg  -     carboprost injection  250 mcg  -     tranexamic acid in NaCl,iso-os IVPB 1,000 mg  -     diphenoxylate-atropine 2.5-0.025 mg per tablet 2 tablet           Reviewed standard labor unit and kick count precautions.  Discussed COVID-19 risks, social distancing, and isolation if respiratory symptoms develop.     Discussed delivery process and plan.  Consent given for delivery.   Discussed labor unit, kick count, pre-eclampsia, rupture membrane precautions.   GBS    MFM note 2/13/25  There was mild fetal growth restriction with an EFW of 1765 g at the 16% and the AC at the 6%  on 1/29/25.  AFV is normal.   BPP 8/8 today, 02/13/2025   Normal umbilical artery Doppler today, 02/13/2025   Plan delivery 38-39 weeks if continued fetal growth and reassuring fetal testing.     The patient is now 2 separate occasions greater than 4 hours apart.  Blood pressures today are persistently mild range.  She now qualifies for diagnosis of at least gestational hypertension.  We discussed risk of gestational hypertension.  We will plan for delivery at 37-38 weeks as long as no evidence of severe preeclampsia arises.  We will send to [facility] now for Pree labs, 24 hour urine, Celestone.  BPP at [facility]    GC CZ TV collect on RTC due to not enough urine today       RTC 1 week NST pending JEAN workup     This note was transcribed by Adela Hoffman. There may be transcription errors as a result, however minimal. Effort has been made to ensure accuracy of transcription, but any obvious errors or omissions should be clarified with the author of the document.       I agree with the above documentation.

## 2025-02-16 ENCOUNTER — PATIENT MESSAGE (OUTPATIENT)
Dept: OTHER | Facility: OTHER | Age: 20
End: 2025-02-16
Payer: MEDICAID

## 2025-02-17 ENCOUNTER — HOSPITAL ENCOUNTER (OUTPATIENT)
Facility: HOSPITAL | Age: 20
Discharge: HOME | End: 2025-02-17
Attending: OBSTETRICS & GYNECOLOGY | Admitting: OBSTETRICS & GYNECOLOGY
Payer: MEDICAID

## 2025-02-17 ENCOUNTER — CLINICAL SUPPORT (OUTPATIENT)
Dept: OBSTETRICS AND GYNECOLOGY | Facility: CLINIC | Age: 20
End: 2025-02-17
Payer: MEDICAID

## 2025-02-17 VITALS
WEIGHT: 154 LBS | HEIGHT: 64 IN | DIASTOLIC BLOOD PRESSURE: 84 MMHG | BODY MASS INDEX: 26.29 KG/M2 | SYSTOLIC BLOOD PRESSURE: 140 MMHG

## 2025-02-17 VITALS — HEART RATE: 96 BPM | DIASTOLIC BLOOD PRESSURE: 74 MMHG | SYSTOLIC BLOOD PRESSURE: 116 MMHG

## 2025-02-17 DIAGNOSIS — Z67.91 RH NEGATIVE STATUS DURING PREGNANCY, THIRD TRIMESTER: ICD-10-CM

## 2025-02-17 DIAGNOSIS — O26.893 RH NEGATIVE STATUS DURING PREGNANCY, THIRD TRIMESTER: ICD-10-CM

## 2025-02-17 DIAGNOSIS — O13.3 GESTATIONAL HYPERTENSION, THIRD TRIMESTER: ICD-10-CM

## 2025-02-17 DIAGNOSIS — Z3A.35 35 WEEKS GESTATION OF PREGNANCY: ICD-10-CM

## 2025-02-17 DIAGNOSIS — O36.5930 MATERNAL CARE FOR OTHER KNOWN OR SUSPECTED POOR FETAL GROWTH, THIRD TRIMESTER, NOT APPLICABLE OR UNSPECIFIED: Primary | ICD-10-CM

## 2025-02-17 DIAGNOSIS — Z36.89 ENCOUNTER FOR TRIAGE IN PREGNANT PATIENT: ICD-10-CM

## 2025-02-17 LAB
ALBUMIN SERPL-MCNC: 3.9 G/DL (ref 3.4–5)
ALBUMIN/GLOB SERPL: 1.1 RATIO
ALP SERPL-CCNC: 131 UNIT/L (ref 50–144)
ALT SERPL-CCNC: 14 UNIT/L (ref 1–45)
ANION GAP SERPL CALC-SCNC: 8 MEQ/L (ref 2–13)
AST SERPL-CCNC: 25 UNIT/L (ref 14–36)
BACTERIA #/AREA URNS AUTO: ABNORMAL /HPF
BASOPHILS # BLD AUTO: 0.01 X10(3)/MCL (ref 0.01–0.08)
BASOPHILS NFR BLD AUTO: 0.1 % (ref 0.1–1.2)
BILIRUB SERPL-MCNC: 0.5 MG/DL (ref 0–1)
BILIRUB UR QL STRIP.AUTO: NEGATIVE
BILIRUB UR QL STRIP: NORMAL
BUN SERPL-MCNC: 3 MG/DL (ref 7–20)
CALCIUM SERPL-MCNC: 9 MG/DL (ref 8.4–10.2)
CHLORIDE SERPL-SCNC: 102 MMOL/L (ref 98–110)
CLARITY UR: CLEAR
CO2 SERPL-SCNC: 24 MMOL/L (ref 21–32)
COLOR UR AUTO: YELLOW
CREAT SERPL-MCNC: 0.46 MG/DL (ref 0.66–1.25)
CREAT UR-MCNC: 84.6 MG/DL
CREAT/UREA NIT SERPL: 7 (ref 12–20)
EOSINOPHIL # BLD AUTO: 0.05 X10(3)/MCL (ref 0.04–0.36)
EOSINOPHIL NFR BLD AUTO: 0.7 % (ref 0.7–7)
ERYTHROCYTE [DISTWIDTH] IN BLOOD BY AUTOMATED COUNT: 15 % (ref 11–14.5)
GFR SERPLBLD CREATININE-BSD FMLA CKD-EPI: >90 ML/MIN/1.73/M2
GLOBULIN SER-MCNC: 3.4 GM/DL (ref 2–3.9)
GLUCOSE SERPL-MCNC: 100 MG/DL (ref 70–115)
GLUCOSE UR QL STRIP: NEGATIVE
GLUCOSE UR QL STRIP: NEGATIVE
HCT VFR BLD AUTO: 34.9 % (ref 36–48)
HGB BLD-MCNC: 11.3 G/DL (ref 11.8–16)
HGB UR QL STRIP: ABNORMAL
IMM GRANULOCYTES # BLD AUTO: 0.04 X10(3)/MCL (ref 0–0.03)
IMM GRANULOCYTES NFR BLD AUTO: 0.6 % (ref 0–0.5)
KETONES UR QL STRIP: NEGATIVE
KETONES UR QL STRIP: NORMAL
LDH SERPL-CCNC: 163 U/L (ref 120–246)
LEUKOCYTE ESTERASE UR QL STRIP: ABNORMAL
LEUKOCYTE ESTERASE UR QL STRIP: NEGATIVE
LYMPHOCYTES # BLD AUTO: 0.95 X10(3)/MCL (ref 1.16–3.74)
LYMPHOCYTES NFR BLD AUTO: 14.1 % (ref 20–55)
MCH RBC QN AUTO: 29.4 PG (ref 27–34)
MCHC RBC AUTO-ENTMCNC: 32.4 G/DL (ref 31–37)
MCV RBC AUTO: 90.6 FL (ref 79–99)
MONOCYTES # BLD AUTO: 0.4 X10(3)/MCL (ref 0.24–0.36)
MONOCYTES NFR BLD AUTO: 5.9 % (ref 4.7–12.5)
NEUTROPHILS # BLD AUTO: 5.3 X10(3)/MCL (ref 1.56–6.13)
NEUTROPHILS NFR BLD AUTO: 78.6 % (ref 37–73)
NITRITE UR QL STRIP: NEGATIVE
NRBC BLD AUTO-RTO: 0 %
PH UR STRIP: 6.5 [PH]
PH, POC UA: NORMAL
PLATELET # BLD AUTO: 195 X10(3)/MCL (ref 140–371)
PMV BLD AUTO: 9.8 FL (ref 9.4–12.4)
POC BLOOD, URINE: NORMAL
POC NITRATES, URINE: NEGATIVE
POTASSIUM SERPL-SCNC: 3.2 MMOL/L (ref 3.5–5.1)
PROT SERPL-MCNC: 7.3 GM/DL (ref 6.3–8.2)
PROT UR QL STRIP: NEGATIVE
PROT UR QL STRIP: NEGATIVE
PROT UR STRIP-MCNC: 13 MG/DL
RBC # BLD AUTO: 3.85 X10(6)/MCL (ref 4–5.1)
RBC #/AREA URNS AUTO: ABNORMAL /HPF
SODIUM SERPL-SCNC: 134 MMOL/L (ref 136–145)
SP GR UR STRIP.AUTO: 1.02 (ref 1–1.03)
SP GR UR STRIP: NORMAL (ref 1–1.03)
SQUAMOUS #/AREA URNS AUTO: ABNORMAL /HPF
URATE SERPL-MCNC: 3.9 MG/DL (ref 2.6–7.2)
URINE PROTEIN/CREATININE RATIO (OLG): 0.2
UROBILINOGEN UR STRIP-ACNC: 0.2
UROBILINOGEN UR STRIP-ACNC: NORMAL (ref 0.3–2.2)
WBC # BLD AUTO: 6.75 X10(3)/MCL (ref 4–11.5)
WBC #/AREA URNS AUTO: ABNORMAL /HPF

## 2025-02-17 PROCEDURE — 36415 COLL VENOUS BLD VENIPUNCTURE: CPT | Performed by: OBSTETRICS & GYNECOLOGY

## 2025-02-17 PROCEDURE — 80053 COMPREHEN METABOLIC PANEL: CPT | Performed by: OBSTETRICS & GYNECOLOGY

## 2025-02-17 PROCEDURE — 87086 URINE CULTURE/COLONY COUNT: CPT | Performed by: OBSTETRICS & GYNECOLOGY

## 2025-02-17 PROCEDURE — 83615 LACTATE (LD) (LDH) ENZYME: CPT | Performed by: OBSTETRICS & GYNECOLOGY

## 2025-02-17 PROCEDURE — 82570 ASSAY OF URINE CREATININE: CPT | Performed by: OBSTETRICS & GYNECOLOGY

## 2025-02-17 PROCEDURE — 85025 COMPLETE CBC W/AUTO DIFF WBC: CPT | Performed by: OBSTETRICS & GYNECOLOGY

## 2025-02-17 PROCEDURE — 81015 MICROSCOPIC EXAM OF URINE: CPT | Performed by: OBSTETRICS & GYNECOLOGY

## 2025-02-17 PROCEDURE — 63600175 PHARM REV CODE 636 W HCPCS: Performed by: OBSTETRICS & GYNECOLOGY

## 2025-02-17 PROCEDURE — 84550 ASSAY OF BLOOD/URIC ACID: CPT | Performed by: OBSTETRICS & GYNECOLOGY

## 2025-02-17 PROCEDURE — 81003 URINALYSIS AUTO W/O SCOPE: CPT | Performed by: OBSTETRICS & GYNECOLOGY

## 2025-02-17 RX ORDER — MISOPROSTOL 100 UG/1
800 TABLET ORAL ONCE AS NEEDED
OUTPATIENT
Start: 2025-02-17

## 2025-02-17 RX ORDER — BETAMETHASONE SODIUM PHOSPHATE AND BETAMETHASONE ACETATE 3; 3 MG/ML; MG/ML
12 INJECTION, SUSPENSION INTRA-ARTICULAR; INTRALESIONAL; INTRAMUSCULAR; SOFT TISSUE
Status: DISCONTINUED | OUTPATIENT
Start: 2025-02-17 | End: 2025-02-17 | Stop reason: HOSPADM

## 2025-02-17 RX ORDER — CEFAZOLIN SODIUM 2 G/50ML
2 SOLUTION INTRAVENOUS ONCE
OUTPATIENT
Start: 2025-02-17 | End: 2025-02-17

## 2025-02-17 RX ORDER — OXYTOCIN/0.9 % SODIUM CHLORIDE 15/250 ML
95 PLASTIC BAG, INJECTION (ML) INTRAVENOUS CONTINUOUS PRN
OUTPATIENT
Start: 2025-02-17

## 2025-02-17 RX ORDER — MUPIROCIN 20 MG/G
OINTMENT TOPICAL
OUTPATIENT
Start: 2025-02-17

## 2025-02-17 RX ORDER — LIDOCAINE HYDROCHLORIDE 10 MG/ML
10 INJECTION, SOLUTION INFILTRATION; PERINEURAL ONCE AS NEEDED
OUTPATIENT
Start: 2025-02-17 | End: 2036-07-16

## 2025-02-17 RX ORDER — CEFAZOLIN SODIUM 1 G/50ML
1 SOLUTION INTRAVENOUS
OUTPATIENT
Start: 2025-02-17

## 2025-02-17 RX ORDER — PENICILLIN G 3000000 [IU]/50ML
3 INJECTION, SOLUTION INTRAVENOUS
OUTPATIENT
Start: 2025-02-17

## 2025-02-17 RX ORDER — MISOPROSTOL 100 MCG
25 TABLET ORAL EVERY 4 HOURS PRN
OUTPATIENT
Start: 2025-02-17

## 2025-02-17 RX ORDER — SODIUM CHLORIDE, SODIUM LACTATE, POTASSIUM CHLORIDE, CALCIUM CHLORIDE 600; 310; 30; 20 MG/100ML; MG/100ML; MG/100ML; MG/100ML
INJECTION, SOLUTION INTRAVENOUS CONTINUOUS
Status: DISCONTINUED | OUTPATIENT
Start: 2025-02-17 | End: 2025-02-17 | Stop reason: HOSPADM

## 2025-02-17 RX ORDER — SODIUM CHLORIDE, SODIUM LACTATE, POTASSIUM CHLORIDE, CALCIUM CHLORIDE 600; 310; 30; 20 MG/100ML; MG/100ML; MG/100ML; MG/100ML
INJECTION, SOLUTION INTRAVENOUS CONTINUOUS
OUTPATIENT
Start: 2025-02-17

## 2025-02-17 RX ORDER — CARBOPROST TROMETHAMINE 250 UG/ML
250 INJECTION, SOLUTION INTRAMUSCULAR
OUTPATIENT
Start: 2025-02-17

## 2025-02-17 RX ORDER — MISOPROSTOL 100 UG/1
800 TABLET ORAL ONCE AS NEEDED
OUTPATIENT
Start: 2025-02-17 | End: 2036-07-16

## 2025-02-17 RX ORDER — TERBUTALINE SULFATE 1 MG/ML
0.25 INJECTION SUBCUTANEOUS
OUTPATIENT
Start: 2025-02-17

## 2025-02-17 RX ORDER — TRANEXAMIC ACID 10 MG/ML
1000 INJECTION, SOLUTION INTRAVENOUS EVERY 30 MIN PRN
OUTPATIENT
Start: 2025-02-17

## 2025-02-17 RX ORDER — OXYTOCIN-SODIUM CHLORIDE 0.9% IV SOLN 30 UNIT/500ML 30-0.9/5 UT/ML-%
30 SOLUTION INTRAVENOUS ONCE AS NEEDED
OUTPATIENT
Start: 2025-02-17 | End: 2036-07-16

## 2025-02-17 RX ORDER — ACETAMINOPHEN 325 MG/1
650 TABLET ORAL EVERY 6 HOURS PRN
OUTPATIENT
Start: 2025-02-17

## 2025-02-17 RX ORDER — SIMETHICONE 80 MG
1 TABLET,CHEWABLE ORAL 4 TIMES DAILY PRN
OUTPATIENT
Start: 2025-02-17

## 2025-02-17 RX ORDER — BUTORPHANOL TARTRATE 2 MG/ML
2 INJECTION INTRAMUSCULAR; INTRAVENOUS
OUTPATIENT
Start: 2025-02-17

## 2025-02-17 RX ORDER — ONDANSETRON 4 MG/1
8 TABLET, ORALLY DISINTEGRATING ORAL EVERY 8 HOURS PRN
Status: DISCONTINUED | OUTPATIENT
Start: 2025-02-17 | End: 2025-02-17 | Stop reason: HOSPADM

## 2025-02-17 RX ORDER — ACETAMINOPHEN 500 MG
500 TABLET ORAL EVERY 6 HOURS PRN
Status: DISCONTINUED | OUTPATIENT
Start: 2025-02-17 | End: 2025-02-17 | Stop reason: HOSPADM

## 2025-02-17 RX ORDER — OXYTOCIN 10 [USP'U]/ML
10 INJECTION, SOLUTION INTRAMUSCULAR; INTRAVENOUS ONCE AS NEEDED
OUTPATIENT
Start: 2025-02-17 | End: 2036-07-16

## 2025-02-17 RX ORDER — SODIUM CHLORIDE 9 MG/ML
INJECTION, SOLUTION INTRAVENOUS
OUTPATIENT
Start: 2025-02-17

## 2025-02-17 RX ORDER — METHYLERGONOVINE MALEATE 0.2 MG/ML
200 INJECTION INTRAVENOUS ONCE AS NEEDED
OUTPATIENT
Start: 2025-02-17 | End: 2036-07-16

## 2025-02-17 RX ORDER — CALCIUM CARBONATE 200(500)MG
500 TABLET,CHEWABLE ORAL 3 TIMES DAILY PRN
OUTPATIENT
Start: 2025-02-17

## 2025-02-17 RX ORDER — BETAMETHASONE SODIUM PHOSPHATE AND BETAMETHASONE ACETATE 3; 3 MG/ML; MG/ML
12 INJECTION, SUSPENSION INTRA-ARTICULAR; INTRALESIONAL; INTRAMUSCULAR; SOFT TISSUE ONCE
Status: CANCELLED | OUTPATIENT
Start: 2025-02-18

## 2025-02-17 RX ORDER — OXYTOCIN/0.9 % SODIUM CHLORIDE 15/250 ML
95 PLASTIC BAG, INJECTION (ML) INTRAVENOUS ONCE AS NEEDED
OUTPATIENT
Start: 2025-02-17 | End: 2036-07-16

## 2025-02-17 RX ORDER — CEFAZOLIN SODIUM 2 G/50ML
2 SOLUTION INTRAVENOUS ONCE AS NEEDED
OUTPATIENT
Start: 2025-02-17 | End: 2036-07-16

## 2025-02-17 RX ORDER — ONDANSETRON 4 MG/1
8 TABLET, ORALLY DISINTEGRATING ORAL EVERY 8 HOURS PRN
OUTPATIENT
Start: 2025-02-17

## 2025-02-17 RX ORDER — BUTORPHANOL TARTRATE 1 MG/ML
1 INJECTION INTRAMUSCULAR; INTRAVENOUS
OUTPATIENT
Start: 2025-02-17

## 2025-02-17 RX ORDER — PROMETHAZINE HYDROCHLORIDE 25 MG/1
25 TABLET ORAL EVERY 6 HOURS PRN
OUTPATIENT
Start: 2025-02-17

## 2025-02-17 RX ORDER — DIPHENOXYLATE HYDROCHLORIDE AND ATROPINE SULFATE 2.5; .025 MG/1; MG/1
2 TABLET ORAL EVERY 6 HOURS PRN
OUTPATIENT
Start: 2025-02-17

## 2025-02-17 RX ORDER — OXYTOCIN-SODIUM CHLORIDE 0.9% IV SOLN 30 UNIT/500ML 30-0.9/5 UT/ML-%
10 SOLUTION INTRAVENOUS ONCE AS NEEDED
OUTPATIENT
Start: 2025-02-17 | End: 2036-07-16

## 2025-02-17 RX ADMIN — BETAMETHASONE SODIUM PHOSPHATE AND BETAMETHASONE ACETATE 12 MG: 3; 3 INJECTION, SUSPENSION INTRA-ARTICULAR; INTRALESIONAL; INTRAMUSCULAR at 01:02

## 2025-02-17 NOTE — NURSING
1235 Pt presented to labor unit for PreE workup. Pt denies pain, headache, blurred vision. Pt reports active fetal movement. UA collected. Pt placed on EFM/Omega. BP obtained. Plan of care discussed, pt verbalizes understanding.     1252 Radiology at bedside.     1300 BPP 8/8. HUEY 12.2 cm    1410 Dr. Law notified of serial BP, labs, BPP results. Orders given to discharge home with 24 hour urine education and return tomorrow for 2nd dose of celestone.     1415 Pt given discharge instructions, 24 hour urine education and to return to receive 2nd dose of celestone. Pt verbalizes understanding. Will keep follow-up appt with Dr. Lutz on Thursday and NST with Dr. Law on Monday.

## 2025-02-17 NOTE — PROGRESS NOTES
"  Maternal Fetal Medicine Follow Up    Subjective:     Patient ID: [id number]    Chief Complaint: [facility] follow up w/us       HPI: Barby Thurman is a 19 y.o. female  at 35w4d gestation with Estimated Date of Delivery: 3/23/25  who is here for follow-up consultation by M.    She has mild fetal growth restriction noted on 2025..  She had negative cell free DNA with primary OB.  She was noted to have elevated blood pressure with primary OB on 02/10/2025 had reassuring labs.  She had recurrent labile BP elevation in primary OB office on 2025, but again checked multiple times after with no elevations with no evidence of gestational hypertension along with a normal labs on 2025..  She was observed in the hospital and received Celestone x2. She had labs on 2025 that were reassuring and normal 24 hour urine with 117 mg protein on 2025.       Interval history since last [facility] visit: None.. She denies any leaking fluid, vaginal bleeding, contractions, decreased fetal movement. Denies headaches, visual disturbances, or epigastric pain.    Pregnancy complications include:   Patient Active Problem List   Diagnosis    Rh negative status during pregnancy, third trimester    Pregnancy affected by fetal growth restriction    Elevated blood pressure affecting pregnancy in third trimester, antepartum       No changes to medical, surgical, family, social, or obstetric history.    Medications:  Current Outpatient Medications   Medication Instructions    PNV no.455-CM-ao0-dha-epa-fish (PRENATAL GUMMIES) 400 mcg-35 mg- 25 mg-5 mg Chew Take by mouth.       Review of Systems   12 point review of systems conducted, negative except as stated in the history of present illness. See HPI for details.      Objective:     Visit Vitals  /71 (BP Location: Left arm, Patient Position: Sitting)   Pulse 85   Ht 5' 4" (1.626 m)   Wt 70.8 kg (156 lb)   LMP 2024 (Exact Date)   BMI 26.78 kg/m²    "     Physical Exam  Vitals and nursing note reviewed.   Constitutional:       Appearance: Normal appearance.   HENT:      Head: Normocephalic and atraumatic.   Cardiovascular:      Rate and Rhythm: Normal rate and regular rhythm.   Pulmonary:      Effort: Pulmonary effort is normal. No respiratory distress.      Breath sounds: Normal breath sounds.   Abdominal:      Palpations: Abdomen is soft.      Tenderness: There is no abdominal tenderness.   Musculoskeletal:      Right lower leg: No edema.      Left lower leg: No edema.   Skin:     General: Skin is warm and dry.   Neurological:      Mental Status: She is alert and oriented to person, place, and time.      Deep Tendon Reflexes: Reflexes normal.   Psychiatric:         Mood and Affect: Mood normal.         Behavior: Behavior normal.         Thought Content: Thought content normal.         Judgment: Judgment normal.         Assessment/Plan:     19 y.o.  female with IUP at 35w4d    Mild fetal growth restriction  There is mild fetal growth with continued fetal growth an EFW of 2226 g at the 9% and the AC at the 6% on 25.  AFV is normal. BPP is 8/8 today (2025).  Umbilical artery Doppler is normal today (2025).     She was previously offered and declined amniocentesis .  She had negative cell free DNA.  She was advised of need for  evaluation.    Complications of growth-restricted neonates include hypoglycemia, hyperbilirubinemia, hypothermia, seizures, sepsis, IVH, RDS, necrotizing enterocolitis, and  asphyxia. Long-term complications include cognitive delay and adult diseases such as cardiovascular disorders and type 2 diabetes. There is an increased risk (~20%) for recurrence of fetal growth restriction in a future pregnancy.    Reviewed that fetal testing is not a 100% protective against the risk of fetal demise in-utero. The importance of doing fetal kick counts three times a day and resting in a lateral recumbent position  and reporting immediately at any time there is any decreased fetal movement even if the same day of testing was also reviewed.    Patient received Celestone x2 on 2/17 and 2/18.    With FGR and labile blood pressures,  recommend delivery at 37-38 weeks' gestation (37-38 6/7th weeks) as optimal balance between prematurity risks and risks of continued pregnancy.  Delivery at 37 versus 38 weeks we would be trade off between prematurity risks as versus risks of continued pregnancy including risk of fetal demise in utero and other complications from continued pregnancy including preeclampsia.  A shared decision-making could be made in the regard of delivery with 37 versus 38 weeks.  Earlier delivery may be needed for worsening maternal or fetal status.  Immediate delivery we would be needed if confirmed gestational hypertension at any time.  Discussed with Dr. Law  .      Labile blood pressures with no evidence of gestational hypertension with a repeat labs normal and repeat blood pressure is normal.  There is normal blood pressure today        Vitals:    02/20/25 1023   BP: 119/71     She is asymptomatic.  On 02/17/2025, preeclampsia labs were reassuring. 24 hour urine showed 117 mg protein.     Discussed with the patient need to continue twice weekly follow-up.  She was advised to check her BP 2-3 times a day.  She was given instructions to come in immediately if she has decreased fetal movements, headaches, vision problems, or epigastric pain.  She is also to come in if blood pressure is at or over 140 systolic or 90 diastolic    Immediate delivery if diagnosis of gestational hypertension as mentioned above.    Delivery timing as above.          Follow up in about 1 week (around 2/27/2025) for BPP and umbilical artery Doppler, MFM follow-up.     Future Appointments   Date Time Provider Department Center   2/21/2025 10:30 AM L&D PRE-ADMIT, [facility]   2/24/2025  8:00 AM Franklyn Law MD [facility] [place] [facility]   2/27/2025  9:45 AM ROOM 3, [facility]   2/27/2025 10:00 AM Randall Lutz MD [facility]      Patient was evaluated and examined by Dr. Lutz. SCOTTY Wasserman, helped in pre charting of part of note.     This note was created with the assistance of [employer] voice recognition software. There may be transcription errors as a result of using this technology, however minimal. Effort has been made to ensure accuracy of transcription, but any obvious errors or omissions should be clarified with the author of the document.

## 2025-02-18 ENCOUNTER — HOSPITAL ENCOUNTER (OUTPATIENT)
Dept: OBSTETRICS AND GYNECOLOGY | Facility: HOSPITAL | Age: 20
Discharge: HOME | End: 2025-02-18
Attending: OBSTETRICS & GYNECOLOGY | Admitting: OBSTETRICS & GYNECOLOGY
Payer: MEDICAID

## 2025-02-18 VITALS — HEART RATE: 104 BPM | SYSTOLIC BLOOD PRESSURE: 123 MMHG | DIASTOLIC BLOOD PRESSURE: 80 MMHG

## 2025-02-18 LAB
PROT 24H UR-MCNC: 117 MG/24 H
PROT UR STRIP-MCNC: 13 MG/DL
TOTAL VOLUME  (OHS): 900 ML

## 2025-02-18 PROCEDURE — 96372 THER/PROPH/DIAG INJ SC/IM: CPT

## 2025-02-18 PROCEDURE — 63600175 PHARM REV CODE 636 W HCPCS: Performed by: OBSTETRICS & GYNECOLOGY

## 2025-02-18 PROCEDURE — 84156 ASSAY OF PROTEIN URINE: CPT | Performed by: OBSTETRICS & GYNECOLOGY

## 2025-02-18 RX ORDER — BETAMETHASONE SODIUM PHOSPHATE AND BETAMETHASONE ACETATE 3; 3 MG/ML; MG/ML
12 INJECTION, SUSPENSION INTRA-ARTICULAR; INTRALESIONAL; INTRAMUSCULAR; SOFT TISSUE ONCE
Status: COMPLETED | OUTPATIENT
Start: 2025-02-18 | End: 2025-02-18

## 2025-02-18 RX ADMIN — BETAMETHASONE SODIUM PHOSPHATE AND BETAMETHASONE ACETATE 12 MG: 3; 3 INJECTION, SUSPENSION INTRA-ARTICULAR; INTRALESIONAL; INTRAMUSCULAR at 03:02

## 2025-02-18 NOTE — NURSING
1515-PT PRESENTED TO OB DEPT FOR BP CHECK, 2ND DOSE OF CELESTONE, AND TO TURN IN 24 HOUR URINE COLLECTED. PT DENIES ANY COMPLAINTS. PT REPORTS ACTIVE FETAL MOVEMENT AND DENIES LEAKING OF FLUID AND DENIES VAGINAL BLEEDING. BP SET TO CHECK EVERY 15 MIN. PLAN OF CARE DISCUSSED WITH PT.    1530- INFORMED OF BP AND FETAL TRACING. ORDERS GIVEN TO D/C PT HOME TO FOLLOW UP WITH MFM ON THURSDAY.

## 2025-02-19 LAB — BACTERIA UR CULT: NO GROWTH

## 2025-02-19 NOTE — PROGRESS NOTES
"  HPI  19 y.o.  at 36w1d here for OB check, NST. S/p [place] /.  Reports positive fetal movement. denies LOF, CTX.   Per Dr. Lutz, delivery of blood pressures persistently elevated.    BP today in clinic 140/70, 144/98  Negative pre-e ROS.     25:   24 hour urine: 117.0     ROS  Review of Systems   Constitutional:  Negative for chills and fever.   Gastrointestinal:  Negative for abdominal pain, constipation and diarrhea.   Genitourinary:  Negative for flank pain, genital sores, pelvic pain, urgency, vaginal bleeding, vaginal discharge, vaginal pain, postcoital bleeding and vaginal odor.         OBJECTIVE  BP (!) 144/98 (BP Location: Left arm, Patient Position: Sitting)   Temp 97.2 °F (36.2 °C) (Temporal)   Ht 5' 4" (1.626 m)   Wt 71.4 kg (157 lb 6.4 oz)   LMP 2024 (Exact Date)   BMI 27.02 kg/m²     Gen: No distress  Abdomen: Gravid, non-tender  Extremities: No edema    NST:   - Baseline: 140  - Variability: moderate  - Accelerations: PRESENT  - Decelerations: ABSENT  - Time on monitor: 20 mins  - Category: .reactive, category 1    [place]:   - CTX: ABSENT  .      ASSESSMENT  1. Pregnancy affected by fetal growth restriction    2. 36 weeks gestation of pregnancy  - POCT Urinalysis, Dipstick, Automated, W/O Scope  - C.trach/N.gonor AMP RNA  - Trichomonas vaginalis Amplified RNA    3. Rh negative status during pregnancy, third trimester    4. Elevated blood pressure affecting pregnancy in third trimester, antepartum    5. Screening for STDs (sexually transmitted diseases)  - C.trach/N.gonor AMP RNA  - Trichomonas vaginalis Amplified RNA        PLAN  Reviewed standard labor unit and kick count precautions.  Discussed pre-eclampsia precautions.  Discussed COVID-19 risks, social distancing, and isolation if respiratory symptoms develop.     MFM note 25:  There is mild fetal growth with continued fetal growth an EFW of 2226 g at the 9% and the AC at the 6% on 25.  AFV is normal. BPP is " 8/8 today (02/20/2025).  Umbilical artery Doppler is normal today (02/20/2025).   She is asymptomatic.  On 02/17/2025, preeclampsia labs were reassuring. 24 hour urine showed 117 mg protein.   She is also to come in if blood pressure is at or over 140 systolic or 90 diastolic   With FGR and labile blood pressures,  recommend delivery at 37-38 weeks' gestation     GC CZ TV urine     To JEAN at this time for pre-e workup. Will plan for delivery if continued elevated readings, pre-e.     RTC pending JEAN workup     This note was transcribed by Adela Hoffman. There may be transcription errors as a result, however minimal. Effort has been made to ensure accuracy of transcription, but any obvious errors or omissions should be clarified with the author of the document.       I agree with the above documentation.

## 2025-02-20 ENCOUNTER — OFFICE VISIT (OUTPATIENT)
Dept: MATERNAL FETAL MEDICINE | Facility: CLINIC | Age: 20
End: 2025-02-20
Payer: MEDICAID

## 2025-02-20 ENCOUNTER — PROCEDURE VISIT (OUTPATIENT)
Dept: MATERNAL FETAL MEDICINE | Facility: CLINIC | Age: 20
End: 2025-02-20
Payer: MEDICAID

## 2025-02-20 VITALS
WEIGHT: 156 LBS | HEART RATE: 85 BPM | SYSTOLIC BLOOD PRESSURE: 119 MMHG | HEIGHT: 64 IN | DIASTOLIC BLOOD PRESSURE: 71 MMHG | BODY MASS INDEX: 26.63 KG/M2

## 2025-02-20 DIAGNOSIS — O36.1190 ISOIMMUNIZATION FROM BLOOD GROUP INCOMPATIBILITY DURING PREGNANCY: ICD-10-CM

## 2025-02-20 DIAGNOSIS — O36.5990 PREGNANCY AFFECTED BY FETAL GROWTH RESTRICTION: ICD-10-CM

## 2025-02-20 DIAGNOSIS — O36.5990 PREGNANCY AFFECTED BY FETAL GROWTH RESTRICTION: Primary | ICD-10-CM

## 2025-02-20 DIAGNOSIS — Z36.89 ENCOUNTER FOR ULTRASOUND TO ASSESS FETAL GROWTH: ICD-10-CM

## 2025-02-20 PROBLEM — O13.3 GESTATIONAL HYPERTENSION, THIRD TRIMESTER: Status: RESOLVED | Noted: 2025-02-17 | Resolved: 2025-02-20

## 2025-02-20 LAB — STREP B PCR (OHS): NOT DETECTED

## 2025-02-21 ENCOUNTER — HOSPITAL ENCOUNTER (OUTPATIENT)
Dept: PREADMISSION TESTING | Facility: HOSPITAL | Age: 20
Discharge: HOME | End: 2025-02-21
Payer: MEDICAID

## 2025-02-21 DIAGNOSIS — O36.1190 ISOIMMUNIZATION FROM BLOOD GROUP INCOMPATIBILITY DURING PREGNANCY: ICD-10-CM

## 2025-02-21 DIAGNOSIS — O36.5930 MATERNAL CARE FOR OTHER KNOWN OR SUSPECTED POOR FETAL GROWTH, THIRD TRIMESTER, NOT APPLICABLE OR UNSPECIFIED: ICD-10-CM

## 2025-02-21 DIAGNOSIS — O36.5990 PREGNANCY AFFECTED BY FETAL GROWTH RESTRICTION: Primary | ICD-10-CM

## 2025-02-21 DIAGNOSIS — O26.893 RH NEGATIVE STATUS DURING PREGNANCY, THIRD TRIMESTER: ICD-10-CM

## 2025-02-21 DIAGNOSIS — Z67.91 RH NEGATIVE STATUS DURING PREGNANCY, THIRD TRIMESTER: ICD-10-CM

## 2025-02-21 LAB
HBV SURFACE AG SERPL QL IA: NEGATIVE
HBV SURFACE AG SERPL QL IA: NORMAL
HIV 1+2 AB+HIV1 P24 AG SERPL QL IA: NONREACTIVE
T PALLIDUM AB SER QL: NONREACTIVE

## 2025-02-21 PROCEDURE — 86780 TREPONEMA PALLIDUM: CPT | Performed by: OBSTETRICS & GYNECOLOGY

## 2025-02-21 PROCEDURE — 87389 HIV-1 AG W/HIV-1&-2 AB AG IA: CPT | Performed by: OBSTETRICS & GYNECOLOGY

## 2025-02-21 PROCEDURE — 87340 HEPATITIS B SURFACE AG IA: CPT | Performed by: OBSTETRICS & GYNECOLOGY

## 2025-02-21 NOTE — DISCHARGE INSTRUCTIONS
Things to Bring to the Hospital    To help make your stay as comfortable as possible you should bring the following items when you come to the hospital:    TOOTHBRUSH  TOOTHPASTE  SHAMPOO/CONDITIONER  DEODORANT  HAIRBRUSH OR COMB  UNDERWEAR  SOAP  SANITARY NAPKINS-OVERNIGHTS (This item is optional unless you have a preference for the type you use.)  NURSING PADS  BREAST PUMP AND NURSING PILLOW    Other items needed for both mom and baby include:    Bras (2-3).  These should be ones that were worn during pregnancy or nursing bras for breastfeeding.   Gowns, slippers, and a robe.   One outfit of baby clothes to take the baby home in as well as a blanket. The hospital will provide baby clothes, blankets, diapers, and wipes during the hospital stay.   Federally approved car seat is required for the infant to go home.     OB VISITATION POLICY    The OB Department is open to family for patient visitation.  Smoking is not allowed on the premises.  Visitors must check with staff before entering a patient's room if there is a sign posted on the door or in the mccarthy.   Visitors may be in the room with baby if they are free from infection or any signs and symptoms of illness.  All visitors must use hand  or soap and water before touching the baby.   No children under age 12 are allowed to sleep overnight or to be left unattended in room.   Our rooms only accommodate one overnight guest.  While in labor and delivery room, you will be allowed 2 support persons if you choose and one additional guest will be allowed at the discretion of the physician or nurse.   The 2-3 labor and delivery support people may be interchangeable at the discretion of the OB nurse in charge of your care.   Any other visitors will be directed to the waiting room or post-partum room until after delivery.   No one will be allowed to stay in the labor and delivery hallway.  After delivery, visitors will not be limited unless a procedure is in  progress or your conditions warrants it.    Videos and photographs are not allowed to be taken during the delivery process. They may be taken after the baby is born and declared in good condition by nursing staff or physician.   If you will be having a delivery by , you will be allowed (1) support person in operating room unless the procedure is an emergency or under general anesthesia. No video or photography is allowed until after the procedure is complete.  The support person present will be asked to leave the operating room with the baby after delivery or if any problems arise.   Family and friends should be made familiar with the visitor policy for our facility, so that we may insure that the safety and well-being of you and your baby during this very important time.

## 2025-02-24 ENCOUNTER — HOSPITAL ENCOUNTER (INPATIENT)
Facility: HOSPITAL | Age: 20
LOS: 4 days | Discharge: HOME | End: 2025-02-28
Attending: OBSTETRICS & GYNECOLOGY | Admitting: OBSTETRICS & GYNECOLOGY
Payer: MEDICAID

## 2025-02-24 ENCOUNTER — HOSPITAL ENCOUNTER (OUTPATIENT)
Facility: HOSPITAL | Age: 20
Discharge: HOME | End: 2025-02-24
Attending: OBSTETRICS & GYNECOLOGY | Admitting: OBSTETRICS & GYNECOLOGY
Payer: MEDICAID

## 2025-02-24 ENCOUNTER — CLINICAL SUPPORT (OUTPATIENT)
Dept: OBSTETRICS AND GYNECOLOGY | Facility: CLINIC | Age: 20
End: 2025-02-24
Payer: MEDICAID

## 2025-02-24 VITALS — OXYGEN SATURATION: 99 % | HEART RATE: 123 BPM | DIASTOLIC BLOOD PRESSURE: 86 MMHG | SYSTOLIC BLOOD PRESSURE: 136 MMHG

## 2025-02-24 VITALS
TEMPERATURE: 97 F | BODY MASS INDEX: 26.87 KG/M2 | WEIGHT: 157.38 LBS | HEIGHT: 64 IN | SYSTOLIC BLOOD PRESSURE: 144 MMHG | DIASTOLIC BLOOD PRESSURE: 98 MMHG

## 2025-02-24 DIAGNOSIS — O36.5930 MATERNAL CARE FOR OTHER KNOWN OR SUSPECTED POOR FETAL GROWTH, THIRD TRIMESTER, NOT APPLICABLE OR UNSPECIFIED: ICD-10-CM

## 2025-02-24 DIAGNOSIS — Z67.91 RH NEGATIVE STATUS DURING PREGNANCY, THIRD TRIMESTER: ICD-10-CM

## 2025-02-24 DIAGNOSIS — O26.893 RH NEGATIVE STATUS DURING PREGNANCY, THIRD TRIMESTER: ICD-10-CM

## 2025-02-24 DIAGNOSIS — O36.5990 PREGNANCY AFFECTED BY FETAL GROWTH RESTRICTION: ICD-10-CM

## 2025-02-24 DIAGNOSIS — O09.40: ICD-10-CM

## 2025-02-24 DIAGNOSIS — Z3A.36 36 WEEKS GESTATION OF PREGNANCY: ICD-10-CM

## 2025-02-24 DIAGNOSIS — Z11.3 SCREENING FOR STDS (SEXUALLY TRANSMITTED DISEASES): ICD-10-CM

## 2025-02-24 DIAGNOSIS — O36.5930 POOR FETAL GROWTH AFFECTING MANAGEMENT OF MOTHER IN THIRD TRIMESTER, SINGLE OR UNSPECIFIED FETUS: Primary | ICD-10-CM

## 2025-02-24 DIAGNOSIS — O16.3 ELEVATED BLOOD PRESSURE AFFECTING PREGNANCY IN THIRD TRIMESTER, ANTEPARTUM: ICD-10-CM

## 2025-02-24 DIAGNOSIS — O36.5990 PREGNANCY AFFECTED BY FETAL GROWTH RESTRICTION: Primary | ICD-10-CM

## 2025-02-24 DIAGNOSIS — I10 HYPERTENSION: ICD-10-CM

## 2025-02-24 DIAGNOSIS — O14.00: ICD-10-CM

## 2025-02-24 LAB
ALBUMIN SERPL-MCNC: 3.8 G/DL (ref 3.4–5)
ALBUMIN/GLOB SERPL: 1.2 RATIO
ALP SERPL-CCNC: 150 UNIT/L (ref 50–144)
ALT SERPL-CCNC: 15 UNIT/L (ref 1–45)
ANION GAP SERPL CALC-SCNC: 8 MEQ/L (ref 2–13)
AST SERPL-CCNC: 24 UNIT/L (ref 14–36)
BACTERIA #/AREA URNS AUTO: ABNORMAL /HPF
BASOPHILS # BLD AUTO: 0.02 X10(3)/MCL (ref 0.01–0.08)
BASOPHILS NFR BLD AUTO: 0.3 % (ref 0.1–1.2)
BILIRUB SERPL-MCNC: 0.4 MG/DL (ref 0–1)
BILIRUB UR QL STRIP.AUTO: NEGATIVE
BILIRUB UR QL STRIP: ABNORMAL
BUN SERPL-MCNC: 5 MG/DL (ref 7–20)
CALCIUM SERPL-MCNC: 9.6 MG/DL (ref 8.4–10.2)
CHLORIDE SERPL-SCNC: 103 MMOL/L (ref 98–110)
CLARITY UR: CLEAR
CO2 SERPL-SCNC: 24 MMOL/L (ref 21–32)
COLOR UR AUTO: YELLOW
CREAT SERPL-MCNC: 0.43 MG/DL (ref 0.66–1.25)
CREAT UR-MCNC: 23 MG/DL
CREAT/UREA NIT SERPL: 12 (ref 12–20)
EOSINOPHIL # BLD AUTO: 0.07 X10(3)/MCL (ref 0.04–0.36)
EOSINOPHIL NFR BLD AUTO: 1 % (ref 0.7–7)
ERYTHROCYTE [DISTWIDTH] IN BLOOD BY AUTOMATED COUNT: 14.4 % (ref 11–14.5)
GFR SERPLBLD CREATININE-BSD FMLA CKD-EPI: >90 ML/MIN/1.73/M2
GLOBULIN SER-MCNC: 3.2 GM/DL (ref 2–3.9)
GLUCOSE SERPL-MCNC: 105 MG/DL (ref 70–115)
GLUCOSE UR QL STRIP: NEGATIVE
GLUCOSE UR QL STRIP: NEGATIVE
HCT VFR BLD AUTO: 34.7 % (ref 36–48)
HGB BLD-MCNC: 11.8 G/DL (ref 11.8–16)
HGB UR QL STRIP: NEGATIVE
IMM GRANULOCYTES # BLD AUTO: 0.04 X10(3)/MCL (ref 0–0.03)
IMM GRANULOCYTES NFR BLD AUTO: 0.6 % (ref 0–0.5)
KETONES UR QL STRIP: ABNORMAL
KETONES UR QL STRIP: NEGATIVE
LDH SERPL-CCNC: 158 U/L (ref 120–246)
LEUKOCYTE ESTERASE UR QL STRIP: ABNORMAL
LEUKOCYTE ESTERASE UR QL STRIP: NEGATIVE
LYMPHOCYTES # BLD AUTO: 1.15 X10(3)/MCL (ref 1.16–3.74)
LYMPHOCYTES NFR BLD AUTO: 17.1 % (ref 20–55)
MCH RBC QN AUTO: 29.6 PG (ref 27–34)
MCHC RBC AUTO-ENTMCNC: 34 G/DL (ref 31–37)
MCV RBC AUTO: 87 FL (ref 79–99)
MONOCYTES # BLD AUTO: 0.51 X10(3)/MCL (ref 0.24–0.36)
MONOCYTES NFR BLD AUTO: 7.6 % (ref 4.7–12.5)
NEUTROPHILS # BLD AUTO: 4.95 X10(3)/MCL (ref 1.56–6.13)
NEUTROPHILS NFR BLD AUTO: 73.4 % (ref 37–73)
NITRITE UR QL STRIP: NEGATIVE
NRBC BLD AUTO-RTO: 0 %
PH UR STRIP: 6.5 [PH]
PH, POC UA: ABNORMAL
PLATELET # BLD AUTO: 208 X10(3)/MCL (ref 140–371)
PMV BLD AUTO: 10.1 FL (ref 9.4–12.4)
POC BLOOD, URINE: ABNORMAL
POC NITRATES, URINE: NEGATIVE
POTASSIUM SERPL-SCNC: 3.9 MMOL/L (ref 3.5–5.1)
PROT SERPL-MCNC: 7 GM/DL (ref 6.3–8.2)
PROT UR QL STRIP: NEGATIVE
PROT UR QL STRIP: NEGATIVE
PROT UR STRIP-MCNC: 13 MG/DL
RBC # BLD AUTO: 3.99 X10(6)/MCL (ref 4–5.1)
RBC #/AREA URNS AUTO: ABNORMAL /HPF
SODIUM SERPL-SCNC: 135 MMOL/L (ref 136–145)
SP GR UR STRIP.AUTO: <=1.005 (ref 1–1.03)
SP GR UR STRIP: ABNORMAL (ref 1–1.03)
SQUAMOUS #/AREA URNS AUTO: ABNORMAL /HPF
URATE SERPL-MCNC: 4.1 MG/DL (ref 2.6–7.2)
URINE PROTEIN/CREATININE RATIO (OLG): 0.6
UROBILINOGEN UR STRIP-ACNC: 0.2
UROBILINOGEN UR STRIP-ACNC: ABNORMAL (ref 0.3–2.2)
WBC # BLD AUTO: 6.74 X10(3)/MCL (ref 4–11.5)
WBC #/AREA URNS AUTO: ABNORMAL /HPF

## 2025-02-24 PROCEDURE — G0378 HOSPITAL OBSERVATION PER HR: HCPCS

## 2025-02-24 PROCEDURE — 36415 COLL VENOUS BLD VENIPUNCTURE: CPT | Performed by: OBSTETRICS & GYNECOLOGY

## 2025-02-24 PROCEDURE — 59025 FETAL NON-STRESS TEST: CPT | Mod: ,,, | Performed by: OBSTETRICS & GYNECOLOGY

## 2025-02-24 PROCEDURE — 99211 OFF/OP EST MAY X REQ PHY/QHP: CPT

## 2025-02-24 PROCEDURE — 11000001 HC ACUTE MED/SURG PRIVATE ROOM

## 2025-02-24 PROCEDURE — 83615 LACTATE (LD) (LDH) ENZYME: CPT | Performed by: OBSTETRICS & GYNECOLOGY

## 2025-02-24 PROCEDURE — 80053 COMPREHEN METABOLIC PANEL: CPT | Performed by: OBSTETRICS & GYNECOLOGY

## 2025-02-24 PROCEDURE — 81015 MICROSCOPIC EXAM OF URINE: CPT | Performed by: OBSTETRICS & GYNECOLOGY

## 2025-02-24 PROCEDURE — 59025 FETAL NON-STRESS TEST: CPT | Mod: 76

## 2025-02-24 PROCEDURE — 99214 OFFICE O/P EST MOD 30 MIN: CPT | Mod: 25,TH,, | Performed by: OBSTETRICS & GYNECOLOGY

## 2025-02-24 PROCEDURE — 84550 ASSAY OF BLOOD/URIC ACID: CPT | Performed by: OBSTETRICS & GYNECOLOGY

## 2025-02-24 PROCEDURE — 85025 COMPLETE CBC W/AUTO DIFF WBC: CPT | Performed by: OBSTETRICS & GYNECOLOGY

## 2025-02-24 PROCEDURE — 25000003 PHARM REV CODE 250: Performed by: OBSTETRICS & GYNECOLOGY

## 2025-02-24 PROCEDURE — 81003 URINALYSIS AUTO W/O SCOPE: CPT | Performed by: OBSTETRICS & GYNECOLOGY

## 2025-02-24 PROCEDURE — 82570 ASSAY OF URINE CREATININE: CPT | Performed by: OBSTETRICS & GYNECOLOGY

## 2025-02-24 PROCEDURE — 59025 FETAL NON-STRESS TEST: CPT

## 2025-02-24 RX ORDER — HYDROXYZINE PAMOATE 25 MG/1
25 CAPSULE ORAL ONCE
Status: COMPLETED | OUTPATIENT
Start: 2025-02-24 | End: 2025-02-24

## 2025-02-24 RX ORDER — SIMETHICONE 80 MG
1 TABLET,CHEWABLE ORAL EVERY 6 HOURS PRN
Status: DISCONTINUED | OUTPATIENT
Start: 2025-02-24 | End: 2025-02-25

## 2025-02-24 RX ORDER — DIPHENHYDRAMINE HYDROCHLORIDE 50 MG/ML
25 INJECTION INTRAMUSCULAR; INTRAVENOUS EVERY 4 HOURS PRN
Status: DISCONTINUED | OUTPATIENT
Start: 2025-02-24 | End: 2025-02-26

## 2025-02-24 RX ORDER — ACETAMINOPHEN 500 MG
500 TABLET ORAL EVERY 6 HOURS PRN
Status: DISCONTINUED | OUTPATIENT
Start: 2025-02-24 | End: 2025-02-24 | Stop reason: HOSPADM

## 2025-02-24 RX ORDER — SODIUM CHLORIDE 0.9 % (FLUSH) 0.9 %
10 SYRINGE (ML) INJECTION
Status: DISCONTINUED | OUTPATIENT
Start: 2025-02-24 | End: 2025-02-26

## 2025-02-24 RX ORDER — AMOXICILLIN 250 MG
1 CAPSULE ORAL NIGHTLY PRN
Status: DISCONTINUED | OUTPATIENT
Start: 2025-02-24 | End: 2025-02-26

## 2025-02-24 RX ORDER — PRENATAL WITH FERROUS FUM AND FOLIC ACID 3080; 920; 120; 400; 22; 1.84; 3; 20; 10; 1; 12; 200; 27; 25; 2 [IU]/1; [IU]/1; MG/1; [IU]/1; MG/1; MG/1; MG/1; MG/1; MG/1; MG/1; UG/1; MG/1; MG/1; MG/1; MG/1
1 TABLET ORAL DAILY
Status: DISCONTINUED | OUTPATIENT
Start: 2025-02-24 | End: 2025-02-26

## 2025-02-24 RX ORDER — ACETAMINOPHEN 325 MG/1
650 TABLET ORAL EVERY 6 HOURS PRN
Status: DISCONTINUED | OUTPATIENT
Start: 2025-02-24 | End: 2025-02-25

## 2025-02-24 RX ORDER — SODIUM CHLORIDE, SODIUM LACTATE, POTASSIUM CHLORIDE, CALCIUM CHLORIDE 600; 310; 30; 20 MG/100ML; MG/100ML; MG/100ML; MG/100ML
INJECTION, SOLUTION INTRAVENOUS CONTINUOUS
Status: DISCONTINUED | OUTPATIENT
Start: 2025-02-24 | End: 2025-02-24 | Stop reason: HOSPADM

## 2025-02-24 RX ORDER — ONDANSETRON 4 MG/1
8 TABLET, ORALLY DISINTEGRATING ORAL EVERY 8 HOURS PRN
Status: DISCONTINUED | OUTPATIENT
Start: 2025-02-24 | End: 2025-02-24 | Stop reason: HOSPADM

## 2025-02-24 RX ORDER — ONDANSETRON 4 MG/1
8 TABLET, ORALLY DISINTEGRATING ORAL EVERY 8 HOURS PRN
Status: DISCONTINUED | OUTPATIENT
Start: 2025-02-24 | End: 2025-02-25

## 2025-02-24 RX ORDER — DIPHENHYDRAMINE HCL 25 MG
25 CAPSULE ORAL EVERY 4 HOURS PRN
Status: DISCONTINUED | OUTPATIENT
Start: 2025-02-24 | End: 2025-02-26

## 2025-02-24 RX ADMIN — HYDROXYZINE PAMOATE 25 MG: 25 CAPSULE ORAL at 11:02

## 2025-02-24 NOTE — NURSING
0915- PT PRESENTS TO UNIT FROM CLINIC PER ORDERS WITH A PRE-E WORK-UP D/T HYPERTENSION AT CLINIC. PT PLACED ON EFM/TOCO, VITALS TAKEN, WNL, URINE COLLECTED. LABS COLLECTED. PLAN OF CARE EXPLAINED TO PT.      1100 - MD UPDATED ON PT STATUS, LABS, AND VITALS, MD STATED HE WILL SPEAK TO ANOTHER MD, AND REPORT BACK. NO NEW ORDERS.    1105 - UPDATED PT ON STATUS, PT STATED SHE HAS JUST BEEN CRYING AND HAVING ANXIETY. SHE DOES NOT TAKE ANYTHING FOR ANXIETY. EDUCATED ON WAYS TO DECREASE ANXIETY.     1110 - DR. MENDOZA PHONED. STATED PT WILL BE TRANSFERRED TO [facility] UNDER THE CARE OF DR. MARCELINO. PT INFORMED.     1115 - ORDERS GIVEN FOR [place] FOR ANXIETY.      1205 - [facility] AT TRANSFER CENTER NOTIFIED US THAT DR. SCOTTIE WHITE ACCEPTED PT AND THAT WE COULD CALL REPORT WHEN WE WERE READY. [facility] WILL NOTIFY EMS FOR TRANSPORTATION.     1210 - DR. MENDOZA AT BEDSIDE TO DISCUSS PT CARE, NO NEW ORDERS RECEIVED. PT GIVEN TIME FOR QUESTIONS, PT VERBALIZED UNDERSTANDING.     1239 - RN GAVE REPORT TO DAIANA GONZALES RN AT [facility]. AS OF NOW PT WILL BE IN ROOM 257 ON 2 WEST ON THE ANTEPARTUM UNIT. NURSE GIVEN TIME FOR QUESTIONS AND VERBALIZED UNDERSTANDING.     1250 - [facility]. AUNT WILL BE RIDING WITH PT TO [place].     1256 - PT DEPARTED UNIT WITH EMS ON STRETCHER IN STABLE CONDITION.

## 2025-02-24 NOTE — H&P
Ante - History & Physical    Chief Complaint: elevated BP     HPI:      Barby Thurman is a 19 y.o.  at 36w1d who presents today for evaluation of above chief complaint.    Presented to clinic without complaints, had mild range Bps, and was sent to [place].  PreE workup wnl other than PC ratio 0.6    Denies HA, vis changes, RUQ pain.     One mild range BP in ambulance transport here     One mild range BP at [facility] today    2 mild range Bps at Dr Law clinic this AM    Pt also has mild FGR    No chief complaint on file.       Patient's last menstrual period was 2024 (exact date).     OB History    Para Term  AB Living   1 0 0 0 0 0   SAB IAB Ectopic Multiple Live Births   0 0 0 0 0      # Outcome Date GA Lbr Luisito/2nd Weight Sex Type Anes PTL Lv   1 Current                Past Medical History:   Diagnosis Date    Rh incompatibility     O-Neg       Past Surgical History:   Procedure Laterality Date    WISDOM TOOTH EXTRACTION      All four were removed       Family History   Problem Relation Name Age of Onset    Breast cancer Maternal Grandmother MGG     Colon cancer Neg Hx      Ovarian cancer Neg Hx      Uterine cancer Neg Hx      Cervical cancer Neg Hx         Social History     Socioeconomic History    Marital status: Single   Tobacco Use    Smoking status: Never     Passive exposure: Current    Smokeless tobacco: Never   Substance and Sexual Activity    Alcohol use: Never    Drug use: Never    Sexual activity: Yes     Partners: Male     Comment: STI: CZ     Social Drivers of Health     Financial Resource Strain: Low Risk  (2025)    Overall Financial Resource Strain (CARDIA)     Difficulty of Paying Living Expenses: Not very hard   Food Insecurity: No Food Insecurity (2025)    Hunger Vital Sign     Worried About Running Out of Food in the Last Year: Never true     Ran Out of Food in the Last Year: Never true   Transportation Needs: No Transportation Needs  (2/21/2025)    PRAPARE - Transportation     Lack of Transportation (Medical): No     Lack of Transportation (Non-Medical): No   Physical Activity: Insufficiently Active (2/21/2025)    Exercise Vital Sign     Days of Exercise per Week: 1 day     Minutes of Exercise per Session: 30 min   Stress: No Stress Concern Present (2/21/2025)    [employer] [place] [employer] - Occupational Stress Questionnaire     Feeling of Stress : Only a little   Housing Stability: Low Risk  (2/21/2025)    Housing Stability Vital Sign     Unable to Pay for Housing in the Last Year: No     Number of Times Moved in the Last Year: 0     Homeless in the Last Year: No       Current Medications[1]    Review of patient's allergies indicates:  No Known Allergies      Physical Exam:      LMP 06/16/2024 (Exact Date)   There is no height or weight on file to calculate BMI.     APPEARANCE: Well nourished, well developed, in no acute distress.  PSYCH: Appropriate mood and affect.  CHEST: Normal respiratory effort,  CV: Normal capillary refill.  ABDOMEN: Soft.  No tenderness or masses.    PELVIC:  deferred   EXTREMITIES: No edema       Results:     Results for orders placed or performed during the hospital encounter of 02/24/25   Comprehensive metabolic panel    Collection Time: 02/24/25  9:18 AM   Result Value Ref Range    Sodium 135 (L) 136 - 145 mmol/L    Potassium 3.9 3.5 - 5.1 mmol/L    Chloride 103 98 - 110 mmol/L    CO2 24 21 - 32 mmol/L    Glucose 105 70 - 115 mg/dL    Blood Urea Nitrogen 5 (L) 7.0 - 20.0 mg/dL    Creatinine 0.43 (L) 0.66 - 1.25 mg/dL    Calcium 9.6 8.4 - 10.2 mg/dL    Protein Total 7.0 6.3 - 8.2 gm/dL    Albumin 3.8 3.4 - 5.0 g/dL    Globulin 3.2 2.0 - 3.9 gm/dL    Albumin/Globulin Ratio 1.2 ratio    Bilirubin Total 0.4 0.0 - 1.0 mg/dL     (H) 50 - 144 unit/L    ALT 15 1 - 45 unit/L    AST 24 14 - 36 unit/L    eGFR >90 mL/min/1.73/m2    Anion Gap 8.0 2.0 - 13.0 mEq/L    BUN/Creatinine Ratio 12 12 - 20   CBC with  Differential    Collection Time: 02/24/25  9:18 AM   Result Value Ref Range    WBC 6.74 4.00 - 11.50 x10(3)/mcL    RBC 3.99 (L) 4.00 - 5.10 x10(6)/mcL    Hgb 11.8 11.8 - 16.0 g/dL    Hct 34.7 (L) 36.0 - 48.0 %    MCV 87.0 79.0 - 99.0 fL    MCH 29.6 27.0 - 34.0 pg    MCHC 34.0 31.0 - 37.0 g/dL    RDW 14.4 11.0 - 14.5 %    Platelet 208 140 - 371 x10(3)/mcL    MPV 10.1 9.4 - 12.4 fL    Neut % 73.4 (H) 37 - 73 %    Lymph % 17.1 (L) 20 - 55 %    Mono % 7.6 4.7 - 12.5 %    Eos % 1.0 0.7 - 7 %    Basophil % 0.3 0.1 - 1.2 %    Lymph # 1.15 (L) 1.16 - 3.74 x10(3)/mcL    Neut # 4.95 1.56 - 6.13 x10(3)/mcL    Mono # 0.51 (H) 0.24 - 0.36 x10(3)/mcL    Eos # 0.07 0.04 - 0.36 x10(3)/mcL    Baso # 0.02 0.01 - 0.08 x10(3)/mcL    Imm Gran # 0.04 (H) 0.00 - 0.03 x10(3)/mcL    Imm Grans % 0.6 (H) 0 - 0.5 %    NRBC% 0.0 <=1 %   Uric Acid    Collection Time: 02/24/25  9:18 AM   Result Value Ref Range    Uric Acid 4.1 2.6 - 7.2 mg/dL   Lactate Dehydrogenase    Collection Time: 02/24/25  9:18 AM   Result Value Ref Range    Lactate Dehydrogenase 158 120 - 246 U/L   Urinalysis, Reflex to Urine Culture    Collection Time: 02/24/25  9:38 AM    Specimen: Urine   Result Value Ref Range    Color, UA Yellow Yellow, Light-Yellow, Dark Yellow, Brittany, Straw    Appearance, UA Clear Clear    Specific Gravity, UA <=1.005 1.005 - 1.030    pH, UA 6.5 5.0 - 8.5    Protein, UA Negative Negative    Glucose, UA Negative Negative, Normal    Ketones, UA Negative Negative    Blood, UA Negative Negative    Bilirubin, UA Negative Negative    Urobilinogen, UA 0.2 0.2, 1.0, Normal    Nitrites, UA Negative Negative    Leukocyte Esterase, UA Moderate (A) Negative   Protein / creatinine ratio, urine    Collection Time: 02/24/25  9:38 AM   Result Value Ref Range    Urine Protein Level 13.0 mg/dL    Urine Creatinine 23.0 mg/dL    Urine Protein/Creatinine Ratio 0.6    Urinalysis, Microscopic    Collection Time: 02/24/25  9:38 AM   Result Value Ref Range    Bacteria, UA  Few (A) None Seen, Rare, Occasional /HPF    RBC, UA 0-2 None Seen, 0-2, 3-5, 0-5 /HPF    WBC, UA 0-5 None Seen, 0-2, 3-5, 0-5 /HPF    Squamous Epithelial Cells, UA Few (A) None Seen, Rare, Occasional, Occ /HPF         Assessment/Plan:     Active Problem List with Overview Notes    Diagnosis Date Noted    Elevated blood pressure affecting pregnancy in third trimester, antepartum 02/10/2025    Pregnancy affected by fetal growth restriction 01/08/2025    Rh negative status during pregnancy, third trimester 10/01/2024      Plan per Dr Lutz:  Observe pt x24hrs  If 2 Bps elevated >4hrs apart WHILE AT [facility], plan for IOL now  24hr urine    Garth Rossi MD  02/24/2025 2:11 PM                     [1]   No current facility-administered medications for this encounter.

## 2025-02-25 PROBLEM — O16.3 ELEVATED BLOOD PRESSURE AFFECTING PREGNANCY IN THIRD TRIMESTER, ANTEPARTUM: Status: RESOLVED | Noted: 2025-02-10 | Resolved: 2025-02-25

## 2025-02-25 PROCEDURE — 25000003 PHARM REV CODE 250: Performed by: OBSTETRICS & GYNECOLOGY

## 2025-02-25 PROCEDURE — 99233 SBSQ HOSP IP/OBS HIGH 50: CPT | Mod: ,,, | Performed by: NURSE PRACTITIONER

## 2025-02-25 PROCEDURE — 25000003 PHARM REV CODE 250: Performed by: STUDENT IN AN ORGANIZED HEALTH CARE EDUCATION/TRAINING PROGRAM

## 2025-02-25 PROCEDURE — 11000001 HC ACUTE MED/SURG PRIVATE ROOM

## 2025-02-25 PROCEDURE — 63600175 PHARM REV CODE 636 W HCPCS: Performed by: OBSTETRICS & GYNECOLOGY

## 2025-02-25 PROCEDURE — 59025 FETAL NON-STRESS TEST: CPT

## 2025-02-25 RX ORDER — METHYLERGONOVINE MALEATE 0.2 MG/ML
200 INJECTION INTRAVENOUS ONCE AS NEEDED
Status: DISCONTINUED | OUTPATIENT
Start: 2025-02-25 | End: 2025-02-26 | Stop reason: SDUPTHER

## 2025-02-25 RX ORDER — MISOPROSTOL 100 UG/1
800 TABLET ORAL ONCE AS NEEDED
Status: DISCONTINUED | OUTPATIENT
Start: 2025-02-25 | End: 2025-02-26 | Stop reason: SDUPTHER

## 2025-02-25 RX ORDER — CALCIUM CARBONATE 200(500)MG
500 TABLET,CHEWABLE ORAL 3 TIMES DAILY PRN
Status: DISCONTINUED | OUTPATIENT
Start: 2025-02-25 | End: 2025-02-26

## 2025-02-25 RX ORDER — ONDANSETRON 4 MG/1
8 TABLET, ORALLY DISINTEGRATING ORAL EVERY 8 HOURS PRN
Status: DISCONTINUED | OUTPATIENT
Start: 2025-02-25 | End: 2025-02-26 | Stop reason: SDUPTHER

## 2025-02-25 RX ORDER — SODIUM CHLORIDE 9 MG/ML
INJECTION, SOLUTION INTRAVENOUS
Status: DISCONTINUED | OUTPATIENT
Start: 2025-02-25 | End: 2025-02-26

## 2025-02-25 RX ORDER — OXYTOCIN-SODIUM CHLORIDE 0.9% IV SOLN 30 UNIT/500ML 30-0.9/5 UT/ML-%
10 SOLUTION INTRAVENOUS ONCE AS NEEDED
Status: DISCONTINUED | OUTPATIENT
Start: 2025-02-25 | End: 2025-02-26

## 2025-02-25 RX ORDER — CARBOPROST TROMETHAMINE 250 UG/ML
250 INJECTION, SOLUTION INTRAMUSCULAR
Status: DISCONTINUED | OUTPATIENT
Start: 2025-02-25 | End: 2025-02-26 | Stop reason: SDUPTHER

## 2025-02-25 RX ORDER — MISOPROSTOL 100 MCG
25 TABLET ORAL EVERY 4 HOURS PRN
Status: DISCONTINUED | OUTPATIENT
Start: 2025-02-25 | End: 2025-02-25

## 2025-02-25 RX ORDER — BUTORPHANOL TARTRATE 2 MG/ML
1 INJECTION INTRAMUSCULAR; INTRAVENOUS
Status: DISCONTINUED | OUTPATIENT
Start: 2025-02-25 | End: 2025-02-26

## 2025-02-25 RX ORDER — SIMETHICONE 80 MG
1 TABLET,CHEWABLE ORAL 4 TIMES DAILY PRN
Status: DISCONTINUED | OUTPATIENT
Start: 2025-02-25 | End: 2025-02-26 | Stop reason: SDUPTHER

## 2025-02-25 RX ORDER — MISOPROSTOL 100 MCG
25 TABLET ORAL EVERY 4 HOURS
Status: DISCONTINUED | OUTPATIENT
Start: 2025-02-25 | End: 2025-02-26

## 2025-02-25 RX ORDER — OXYTOCIN-SODIUM CHLORIDE 0.9% IV SOLN 30 UNIT/500ML 30-0.9/5 UT/ML-%
95 SOLUTION INTRAVENOUS ONCE AS NEEDED
Status: DISCONTINUED | OUTPATIENT
Start: 2025-02-25 | End: 2025-02-26 | Stop reason: SDUPTHER

## 2025-02-25 RX ORDER — DIPHENOXYLATE HYDROCHLORIDE AND ATROPINE SULFATE 2.5; .025 MG/1; MG/1
2 TABLET ORAL EVERY 6 HOURS PRN
Status: DISCONTINUED | OUTPATIENT
Start: 2025-02-25 | End: 2025-02-26 | Stop reason: SDUPTHER

## 2025-02-25 RX ORDER — ACETAMINOPHEN 325 MG/1
650 TABLET ORAL EVERY 6 HOURS PRN
Status: DISCONTINUED | OUTPATIENT
Start: 2025-02-25 | End: 2025-02-26 | Stop reason: SDUPTHER

## 2025-02-25 RX ORDER — OXYTOCIN-SODIUM CHLORIDE 0.9% IV SOLN 30 UNIT/500ML 30-0.9/5 UT/ML-%
95 SOLUTION INTRAVENOUS CONTINUOUS PRN
Status: DISCONTINUED | OUTPATIENT
Start: 2025-02-25 | End: 2025-02-26 | Stop reason: SDUPTHER

## 2025-02-25 RX ORDER — TERBUTALINE SULFATE 1 MG/ML
0.25 INJECTION SUBCUTANEOUS
Status: DISCONTINUED | OUTPATIENT
Start: 2025-02-25 | End: 2025-02-26

## 2025-02-25 RX ORDER — SODIUM CHLORIDE, SODIUM LACTATE, POTASSIUM CHLORIDE, CALCIUM CHLORIDE 600; 310; 30; 20 MG/100ML; MG/100ML; MG/100ML; MG/100ML
INJECTION, SOLUTION INTRAVENOUS CONTINUOUS
Status: DISCONTINUED | OUTPATIENT
Start: 2025-02-25 | End: 2025-02-26

## 2025-02-25 RX ORDER — LIDOCAINE HYDROCHLORIDE 10 MG/ML
10 INJECTION, SOLUTION INFILTRATION; PERINEURAL ONCE AS NEEDED
Status: DISCONTINUED | OUTPATIENT
Start: 2025-02-25 | End: 2025-02-26

## 2025-02-25 RX ORDER — BUTORPHANOL TARTRATE 2 MG/ML
2 INJECTION INTRAMUSCULAR; INTRAVENOUS
Status: DISCONTINUED | OUTPATIENT
Start: 2025-02-25 | End: 2025-02-26

## 2025-02-25 RX ORDER — OXYTOCIN-SODIUM CHLORIDE 0.9% IV SOLN 30 UNIT/500ML 30-0.9/5 UT/ML-%
10 SOLUTION INTRAVENOUS ONCE AS NEEDED
Status: DISCONTINUED | OUTPATIENT
Start: 2025-02-25 | End: 2025-02-26 | Stop reason: SDUPTHER

## 2025-02-25 RX ORDER — OXYTOCIN 10 [USP'U]/ML
10 INJECTION, SOLUTION INTRAMUSCULAR; INTRAVENOUS ONCE AS NEEDED
Status: DISCONTINUED | OUTPATIENT
Start: 2025-02-25 | End: 2025-02-26 | Stop reason: SDUPTHER

## 2025-02-25 RX ORDER — PENICILLIN G 3000000 [IU]/50ML
3 INJECTION, SOLUTION INTRAVENOUS
Status: DISCONTINUED | OUTPATIENT
Start: 2025-02-25 | End: 2025-02-25

## 2025-02-25 RX ORDER — MUPIROCIN 20 MG/G
OINTMENT TOPICAL
Status: DISCONTINUED | OUTPATIENT
Start: 2025-02-25 | End: 2025-02-26

## 2025-02-25 RX ORDER — BISACODYL 10 MG/1
10 SUPPOSITORY RECTAL DAILY PRN
Status: DISCONTINUED | OUTPATIENT
Start: 2025-02-25 | End: 2025-02-26

## 2025-02-25 RX ADMIN — MISOPROSTOL 25 MCG: 100 TABLET ORAL at 11:02

## 2025-02-25 RX ADMIN — SODIUM CHLORIDE, POTASSIUM CHLORIDE, SODIUM LACTATE AND CALCIUM CHLORIDE: 600; 310; 30; 20 INJECTION, SOLUTION INTRAVENOUS at 10:02

## 2025-02-25 RX ADMIN — MISOPROSTOL 25 MCG: 100 TABLET ORAL at 03:02

## 2025-02-25 RX ADMIN — DEXTROSE MONOHYDRATE 5 MILLION UNITS: 5 INJECTION INTRAVENOUS at 10:02

## 2025-02-25 RX ADMIN — PENICILLIN G 3 MILLION UNITS: 3000000 INJECTION, SOLUTION INTRAVENOUS at 10:02

## 2025-02-25 RX ADMIN — SODIUM CHLORIDE, POTASSIUM CHLORIDE, SODIUM LACTATE AND CALCIUM CHLORIDE 1000 ML: 600; 310; 30; 20 INJECTION, SOLUTION INTRAVENOUS at 11:02

## 2025-02-25 RX ADMIN — SODIUM CHLORIDE, POTASSIUM CHLORIDE, SODIUM LACTATE AND CALCIUM CHLORIDE: 600; 310; 30; 20 INJECTION, SOLUTION INTRAVENOUS at 05:02

## 2025-02-25 RX ADMIN — PRENATAL VITAMINS-IRON FUMARATE 27 MG IRON-FOLIC ACID 0.8 MG TABLET 1 TABLET: at 09:02

## 2025-02-25 RX ADMIN — BISACODYL 10 MG: 10 SUPPOSITORY RECTAL at 09:02

## 2025-02-25 RX ADMIN — PENICILLIN G 3 MILLION UNITS: 3000000 INJECTION, SOLUTION INTRAVENOUS at 06:02

## 2025-02-25 RX ADMIN — PENICILLIN G 3 MILLION UNITS: 3000000 INJECTION, SOLUTION INTRAVENOUS at 02:02

## 2025-02-25 NOTE — CONSULTS
Maternal Fetal Medicine Consult Note    Barby Thurman is a 19 y.o.  female  at 36w2d  gestation who was admitted to rule out preeclampsia. She is known patient to [facility], followed for FGR. She has been having some labile BP since 2/10. She had another couple sustained elevations on  and received course of steroids on  and . She had elevated BP again yesterday in OB clinic and a couple elevations in hospital/overnight. She had normal labs with e/o elevated p/c ratio. Currently, she has no complaints. She denies leaking of fluid, vaginal bleeding, contractions, or decreased fetal movement. She also denies headaches, visual disturbances, or epigastric pain.    Review of Systems   12 point review of systems conducted, negative except as stated in the history of present illness. See HPI for details.    Past Medical History:   Diagnosis Date    Rh incompatibility     O-Neg        Past Surgical History:   Procedure Laterality Date    WISDOM TOOTH EXTRACTION  2020    All four were removed              Family History   Problem Relation Name Age of Onset    Breast cancer Maternal Grandmother MGG     Colon cancer Neg Hx      Ovarian cancer Neg Hx      Uterine cancer Neg Hx      Cervical cancer Neg Hx          Temp:  [97.6 °F (36.4 °C)-98.2 °F (36.8 °C)] 97.6 °F (36.4 °C)  Pulse:  [0-132] 94  Resp:  [15-18] 18  SpO2:  [98 %-100 %] 100 %  BP: (115-150)/(58-98) 131/86    Physical Exam  Vitals and nursing note reviewed.   Constitutional:       Appearance: Normal appearance.   HENT:      Head: Normocephalic and atraumatic.   Cardiovascular:      Rate and Rhythm: Normal rate and regular rhythm.   Pulmonary:      Effort: Pulmonary effort is normal. No respiratory distress.      Breath sounds: Normal breath sounds.   Abdominal:      Palpations: Abdomen is soft.      Tenderness: There is no abdominal tenderness.   Musculoskeletal:      Right lower leg: No edema.      Left lower leg: No edema.   Skin:     General:  Skin is warm and dry.   Neurological:      Mental Status: She is alert and oriented to person, place, and time.      Deep Tendon Reflexes: Reflexes normal.   Psychiatric:         Mood and Affect: Mood normal.         Behavior: Behavior normal.         Thought Content: Thought content normal.         Judgment: Judgment normal.        Recent Results (from the past 48 hours)   POCT Urinalysis, Dipstick, Automated, W/O Scope    Collection Time: 02/24/25  8:31 AM   Result Value Ref Range    POC Blood, Urine      POC Bilirubin, Urine      POC Urobilinogen, Urine      POC Ketones, Urine      POC Protein, Urine Negative Negative    POC Nitrates, Urine Negative Negative    POC Glucose, Urine Negative Negative    pH, UA      POC Specific Gravity, Urine      POC Leukocytes, Urine Negative (A) Negative   Comprehensive metabolic panel    Collection Time: 02/24/25  9:18 AM   Result Value Ref Range    Sodium 135 (L) 136 - 145 mmol/L    Potassium 3.9 3.5 - 5.1 mmol/L    Chloride 103 98 - 110 mmol/L    CO2 24 21 - 32 mmol/L    Glucose 105 70 - 115 mg/dL    Blood Urea Nitrogen 5 (L) 7.0 - 20.0 mg/dL    Creatinine 0.43 (L) 0.66 - 1.25 mg/dL    Calcium 9.6 8.4 - 10.2 mg/dL    Protein Total 7.0 6.3 - 8.2 gm/dL    Albumin 3.8 3.4 - 5.0 g/dL    Globulin 3.2 2.0 - 3.9 gm/dL    Albumin/Globulin Ratio 1.2 ratio    Bilirubin Total 0.4 0.0 - 1.0 mg/dL     (H) 50 - 144 unit/L    ALT 15 1 - 45 unit/L    AST 24 14 - 36 unit/L    eGFR >90 mL/min/1.73/m2    Anion Gap 8.0 2.0 - 13.0 mEq/L    BUN/Creatinine Ratio 12 12 - 20   CBC with Differential    Collection Time: 02/24/25  9:18 AM   Result Value Ref Range    WBC 6.74 4.00 - 11.50 x10(3)/mcL    RBC 3.99 (L) 4.00 - 5.10 x10(6)/mcL    Hgb 11.8 11.8 - 16.0 g/dL    Hct 34.7 (L) 36.0 - 48.0 %    MCV 87.0 79.0 - 99.0 fL    MCH 29.6 27.0 - 34.0 pg    MCHC 34.0 31.0 - 37.0 g/dL    RDW 14.4 11.0 - 14.5 %    Platelet 208 140 - 371 x10(3)/mcL    MPV 10.1 9.4 - 12.4 fL    Neut % 73.4 (H) 37 - 73 %     Lymph % 17.1 (L) 20 - 55 %    Mono % 7.6 4.7 - 12.5 %    Eos % 1.0 0.7 - 7 %    Basophil % 0.3 0.1 - 1.2 %    Lymph # 1.15 (L) 1.16 - 3.74 x10(3)/mcL    Neut # 4.95 1.56 - 6.13 x10(3)/mcL    Mono # 0.51 (H) 0.24 - 0.36 x10(3)/mcL    Eos # 0.07 0.04 - 0.36 x10(3)/mcL    Baso # 0.02 0.01 - 0.08 x10(3)/mcL    Imm Gran # 0.04 (H) 0.00 - 0.03 x10(3)/mcL    Imm Grans % 0.6 (H) 0 - 0.5 %    NRBC% 0.0 <=1 %   Uric Acid    Collection Time: 02/24/25  9:18 AM   Result Value Ref Range    Uric Acid 4.1 2.6 - 7.2 mg/dL   Lactate Dehydrogenase    Collection Time: 02/24/25  9:18 AM   Result Value Ref Range    Lactate Dehydrogenase 158 120 - 246 U/L   Urinalysis, Reflex to Urine Culture    Collection Time: 02/24/25  9:38 AM    Specimen: Urine   Result Value Ref Range    Color, UA Yellow Yellow, Light-Yellow, Dark Yellow, Brittany, Straw    Appearance, UA Clear Clear    Specific Gravity, UA <=1.005 1.005 - 1.030    pH, UA 6.5 5.0 - 8.5    Protein, UA Negative Negative    Glucose, UA Negative Negative, Normal    Ketones, UA Negative Negative    Blood, UA Negative Negative    Bilirubin, UA Negative Negative    Urobilinogen, UA 0.2 0.2, 1.0, Normal    Nitrites, UA Negative Negative    Leukocyte Esterase, UA Moderate (A) Negative   Protein / creatinine ratio, urine    Collection Time: 02/24/25  9:38 AM   Result Value Ref Range    Urine Protein Level 13.0 mg/dL    Urine Creatinine 23.0 mg/dL    Urine Protein/Creatinine Ratio 0.6    Urinalysis, Microscopic    Collection Time: 02/24/25  9:38 AM   Result Value Ref Range    Bacteria, UA Few (A) None Seen, Rare, Occasional /HPF    RBC, UA 0-2 None Seen, 0-2, 3-5, 0-5 /HPF    WBC, UA 0-5 None Seen, 0-2, 3-5, 0-5 /HPF    Squamous Epithelial Cells, UA Few (A) None Seen, Rare, Occasional, Occ /HPF        US OB 14+ Weeks TransAbd, w/Biophysical Profile, w/o NST, Single Gestation (xpd)  Narrative: EXAMINATION:  US OB 14+ WEEKS, TRANSABDOM W/ BIOPHYSICAL PROFILE, W/O NST, SINGLE GESTATION  (XPD)    CLINICAL HISTORY:  elevated blood pressure in clinic;    TECHNIQUE:  Obstetric ultrasound greater than 14 weeks performed to evaluate fetal viability and biophysical profile    COMPARISON:  None    FINDINGS:  Single viable intrauterine pregnancy identified in vertex position with fetal heart rate of 150 beats per minute.  Placenta anterior without previa or abruption.  Amniotic fluid index 12.2 cm.  Biophysical profile 8/8.  Impression: Single viable intrauterine pregnancy with a biophysical profile of 8/8.    Electronically signed by: Giovanni Cai MD  Date:    02/17/2025  Time:    13:44      Assessment/Plan    36w2d with:    Gestational hypertension  FHT currently 140 baseline, moderate variability, 15x15 accels, no decels, no contractions  BP's reviewed. Another couple mild range elevations, confirming mild preeclampsia   Discussed with Dr. Lutz. With FGR and mild preeclampsia, s/p steroids, recommend proceed with IOL at this time. Discussed with patient POC with risks/benefits of expectant management vs delivery. Discussed with her that, with findings as above, delivery at this time outweighs risks of prematurity. Answered all questions and she v/u and agreed with POC.  Dr Lutz will discuss with Dr. Garcia who will be following the patient in labor.     Reviewed rare risk of PP preeclampsia/eclampsia that could occur days or even weeks after delivery with no prevention available. Instructed to report immediately severe headaches, epigastric pain, vision changes, SOB, swelling. Recommend low dose aspirin at 12 weeks in future pregnancy  Advised of importance of close outpatient followup after delivery for ongoing care of the hypertension.       This note was created with the assistance of [employer] voice recognition software. There may be transcription errors as a result of using this technology, however minimal. Effort has been made to assure accuracy of transcription, but any obvious errors or  omissions should be clarified with the author of the document.

## 2025-02-25 NOTE — PROGRESS NOTES
Received plan per Dr Lutz that IOL indicated with gestational hypertension and FGR at 36w2d.  Discussed with patient plan of care and she was aware.  GBS unknown.  Cervix 1/thick/-3 soft, posterior. Vertex.    Will transfer to L&D after suppository (patient request) and initiate misoprostol cervical ripening. PCN for  status and unknown GBS status.

## 2025-02-25 NOTE — PROGRESS NOTES
LABOR PROGRESS NOTE:     19 y.o.  @ 36w2d undergoing IOL for FGR, gestational hypertension. S/P misoprostol 25 mcg PV at 1100.  Denies painful contractions, VB, ROM     Temp:  [97.6 °F (36.4 °C)-98.7 °F (37.1 °C)] 98.7 °F (37.1 °C)  Pulse:  [] 119  Resp:  [15-18] 16  SpO2:  [98 %-100 %] 98 %  BP: (115-150)/(58-96) 126/72    FHT:  140   Variability: moderate   Accelerations:  Present   Decelerations:  Absent   Category:  I   Contractions:  Irregular, Q 2-4 min       SVE: 1/80/-2 posterior, medium       Membranes:  Intact   Augmentation: N/A   GBS Status:  Prophylaxis indicated   Pain: Epidural available upon request       Dispo: Repeat misoprostol if able with contractions. If unable, start oxytocin     Walter Fang MD

## 2025-02-26 ENCOUNTER — ANESTHESIA (OUTPATIENT)
Dept: OBSTETRICS AND GYNECOLOGY | Facility: HOSPITAL | Age: 20
End: 2025-02-26
Payer: MEDICAID

## 2025-02-26 ENCOUNTER — ANESTHESIA EVENT (OUTPATIENT)
Dept: OBSTETRICS AND GYNECOLOGY | Facility: HOSPITAL | Age: 20
End: 2025-02-26
Payer: MEDICAID

## 2025-02-26 LAB
ALBUMIN SERPL-MCNC: 2.6 G/DL (ref 3.5–5)
ALBUMIN/GLOB SERPL: 0.7 RATIO (ref 1.1–2)
ALP SERPL-CCNC: 132 UNIT/L (ref 40–150)
ALT SERPL-CCNC: 10 UNIT/L (ref 0–55)
ANION GAP SERPL CALC-SCNC: 9 MEQ/L
AST SERPL-CCNC: 22 UNIT/L (ref 5–34)
BASOPHILS # BLD AUTO: 0.03 X10(3)/MCL
BASOPHILS NFR BLD AUTO: 0.3 %
BILIRUB SERPL-MCNC: 0.7 MG/DL
BUN SERPL-MCNC: 4.3 MG/DL (ref 7–18.7)
CALCIUM SERPL-MCNC: 8.7 MG/DL (ref 8.4–10.2)
CHLORIDE SERPL-SCNC: 107 MMOL/L (ref 98–107)
CO2 SERPL-SCNC: 21 MMOL/L (ref 22–29)
CREAT SERPL-MCNC: 0.52 MG/DL (ref 0.55–1.02)
CREAT/UREA NIT SERPL: 8
EOSINOPHIL # BLD AUTO: 0.03 X10(3)/MCL (ref 0–0.9)
EOSINOPHIL NFR BLD AUTO: 0.3 %
ERYTHROCYTE [DISTWIDTH] IN BLOOD BY AUTOMATED COUNT: 14.3 % (ref 11.5–17)
GFR SERPLBLD CREATININE-BSD FMLA CKD-EPI: >60 ML/MIN/1.73/M2
GLOBULIN SER-MCNC: 3.6 GM/DL (ref 2.4–3.5)
GLUCOSE SERPL-MCNC: 88 MG/DL (ref 74–100)
HCT VFR BLD AUTO: 34.2 % (ref 37–47)
HGB BLD-MCNC: 11.3 G/DL (ref 12–16)
HIV 1+2 AB+HIV1 P24 AG SERPL QL IA: NONREACTIVE
IMM GRANULOCYTES # BLD AUTO: 0.07 X10(3)/MCL (ref 0–0.04)
IMM GRANULOCYTES NFR BLD AUTO: 0.6 %
LYMPHOCYTES # BLD AUTO: 0.8 X10(3)/MCL (ref 0.6–4.6)
LYMPHOCYTES NFR BLD AUTO: 6.9 %
MCH RBC QN AUTO: 29.7 PG (ref 27–31)
MCHC RBC AUTO-ENTMCNC: 33 G/DL (ref 33–36)
MCV RBC AUTO: 90 FL (ref 80–94)
MONOCYTES # BLD AUTO: 0.87 X10(3)/MCL (ref 0.1–1.3)
MONOCYTES NFR BLD AUTO: 7.5 %
NEUTROPHILS # BLD AUTO: 9.75 X10(3)/MCL (ref 2.1–9.2)
NEUTROPHILS NFR BLD AUTO: 84.4 %
NRBC BLD AUTO-RTO: 0 %
PLATELET # BLD AUTO: 180 X10(3)/MCL (ref 130–400)
PMV BLD AUTO: 10 FL (ref 7.4–10.4)
POTASSIUM SERPL-SCNC: 3.5 MMOL/L (ref 3.5–5.1)
PROT SERPL-MCNC: 6.2 GM/DL (ref 6.4–8.3)
RBC # BLD AUTO: 3.8 X10(6)/MCL (ref 4.2–5.4)
ROSETTE - FMH (FETAL BLEED SCREEN): NORMAL
SODIUM SERPL-SCNC: 137 MMOL/L (ref 136–145)
SPECIMEN SOURCE: NORMAL
T VAGINALIS RRNA SPEC QL NAA+PROBE: NEGATIVE
WBC # BLD AUTO: 11.55 X10(3)/MCL (ref 4.5–11.5)

## 2025-02-26 PROCEDURE — 86762 RUBELLA ANTIBODY: CPT | Performed by: OBSTETRICS & GYNECOLOGY

## 2025-02-26 PROCEDURE — 87389 HIV-1 AG W/HIV-1&-2 AB AG IA: CPT | Performed by: OBSTETRICS & GYNECOLOGY

## 2025-02-26 PROCEDURE — 87340 HEPATITIS B SURFACE AG IA: CPT | Performed by: OBSTETRICS & GYNECOLOGY

## 2025-02-26 PROCEDURE — 51701 INSERT BLADDER CATHETER: CPT

## 2025-02-26 PROCEDURE — 86900 BLOOD TYPING SEROLOGIC ABO: CPT | Performed by: OBSTETRICS & GYNECOLOGY

## 2025-02-26 PROCEDURE — 25000003 PHARM REV CODE 250: Performed by: OBSTETRICS & GYNECOLOGY

## 2025-02-26 PROCEDURE — 86870 RBC ANTIBODY IDENTIFICATION: CPT | Performed by: OBSTETRICS & GYNECOLOGY

## 2025-02-26 PROCEDURE — 85461 HEMOGLOBIN FETAL: CPT

## 2025-02-26 PROCEDURE — 25000003 PHARM REV CODE 250: Performed by: STUDENT IN AN ORGANIZED HEALTH CARE EDUCATION/TRAINING PROGRAM

## 2025-02-26 PROCEDURE — 72200005 HC VAGINAL DELIVERY LEVEL II

## 2025-02-26 PROCEDURE — 63600175 PHARM REV CODE 636 W HCPCS: Performed by: OBSTETRICS & GYNECOLOGY

## 2025-02-26 PROCEDURE — 63600175 PHARM REV CODE 636 W HCPCS: Performed by: STUDENT IN AN ORGANIZED HEALTH CARE EDUCATION/TRAINING PROGRAM

## 2025-02-26 PROCEDURE — 63600175 PHARM REV CODE 636 W HCPCS: Performed by: SPECIALIST

## 2025-02-26 PROCEDURE — 62326 NJX INTERLAMINAR LMBR/SAC: CPT | Performed by: STUDENT IN AN ORGANIZED HEALTH CARE EDUCATION/TRAINING PROGRAM

## 2025-02-26 PROCEDURE — 85025 COMPLETE CBC W/AUTO DIFF WBC: CPT | Performed by: OBSTETRICS & GYNECOLOGY

## 2025-02-26 PROCEDURE — 63600175 PHARM REV CODE 636 W HCPCS: Performed by: ANESTHESIOLOGY

## 2025-02-26 PROCEDURE — 51702 INSERT TEMP BLADDER CATH: CPT

## 2025-02-26 PROCEDURE — 10907ZC DRAINAGE OF AMNIOTIC FLUID, THERAPEUTIC FROM PRODUCTS OF CONCEPTION, VIA NATURAL OR ARTIFICIAL OPENING: ICD-10-PCS | Performed by: OBSTETRICS & GYNECOLOGY

## 2025-02-26 PROCEDURE — 25000003 PHARM REV CODE 250

## 2025-02-26 PROCEDURE — 72100003 HC LABOR CARE, EA. ADDL. 8 HRS

## 2025-02-26 PROCEDURE — 72100002 HC LABOR CARE, 1ST 8 HOURS

## 2025-02-26 PROCEDURE — 99900035 HC TECH TIME PER 15 MIN (STAT)

## 2025-02-26 PROCEDURE — 80053 COMPREHEN METABOLIC PANEL: CPT | Performed by: OBSTETRICS & GYNECOLOGY

## 2025-02-26 PROCEDURE — 11000001 HC ACUTE MED/SURG PRIVATE ROOM

## 2025-02-26 PROCEDURE — 36415 COLL VENOUS BLD VENIPUNCTURE: CPT | Performed by: OBSTETRICS & GYNECOLOGY

## 2025-02-26 RX ORDER — OXYTOCIN-SODIUM CHLORIDE 0.9% IV SOLN 30 UNIT/500ML 30-0.9/5 UT/ML-%
95 SOLUTION INTRAVENOUS ONCE AS NEEDED
Status: DISCONTINUED | OUTPATIENT
Start: 2025-02-26 | End: 2025-02-26 | Stop reason: SDUPTHER

## 2025-02-26 RX ORDER — MISOPROSTOL 100 UG/1
800 TABLET ORAL ONCE AS NEEDED
Status: DISCONTINUED | OUTPATIENT
Start: 2025-02-26 | End: 2025-02-26 | Stop reason: SDUPTHER

## 2025-02-26 RX ORDER — DOCUSATE SODIUM 100 MG/1
200 CAPSULE, LIQUID FILLED ORAL 2 TIMES DAILY PRN
Status: DISCONTINUED | OUTPATIENT
Start: 2025-02-26 | End: 2025-02-28 | Stop reason: HOSPADM

## 2025-02-26 RX ORDER — LIDOCAINE HYDROCHLORIDE 10 MG/ML
10 INJECTION, SOLUTION INFILTRATION; PERINEURAL ONCE AS NEEDED
Status: DISCONTINUED | OUTPATIENT
Start: 2025-02-26 | End: 2025-02-27

## 2025-02-26 RX ORDER — SODIUM CHLORIDE, SODIUM LACTATE, POTASSIUM CHLORIDE, CALCIUM CHLORIDE 600; 310; 30; 20 MG/100ML; MG/100ML; MG/100ML; MG/100ML
INJECTION, SOLUTION INTRAVENOUS CONTINUOUS
Status: DISCONTINUED | OUTPATIENT
Start: 2025-02-26 | End: 2025-02-27

## 2025-02-26 RX ORDER — BUTORPHANOL TARTRATE 2 MG/ML
2 INJECTION INTRAMUSCULAR; INTRAVENOUS
Status: DISCONTINUED | OUTPATIENT
Start: 2025-02-26 | End: 2025-02-27

## 2025-02-26 RX ORDER — CEFAZOLIN SODIUM 1 G/3ML
1 INJECTION, POWDER, FOR SOLUTION INTRAMUSCULAR; INTRAVENOUS
Status: DISCONTINUED | OUTPATIENT
Start: 2025-02-27 | End: 2025-02-26

## 2025-02-26 RX ORDER — DIPHENHYDRAMINE HCL 25 MG
25 CAPSULE ORAL EVERY 4 HOURS PRN
Status: DISCONTINUED | OUTPATIENT
Start: 2025-02-26 | End: 2025-02-28 | Stop reason: HOSPADM

## 2025-02-26 RX ORDER — OXYTOCIN-SODIUM CHLORIDE 0.9% IV SOLN 30 UNIT/500ML 30-0.9/5 UT/ML-%
10 SOLUTION INTRAVENOUS ONCE AS NEEDED
Status: DISCONTINUED | OUTPATIENT
Start: 2025-02-26 | End: 2025-02-27

## 2025-02-26 RX ORDER — FENTANYL/BUPIVACAINE/NS/PF 2-1250MCG
PLASTIC BAG, INJECTION (ML) INJECTION CONTINUOUS
Refills: 0 | Status: DISCONTINUED | OUTPATIENT
Start: 2025-02-26 | End: 2025-02-26

## 2025-02-26 RX ORDER — HYDROCORTISONE 25 MG/G
CREAM TOPICAL 3 TIMES DAILY PRN
Status: DISCONTINUED | OUTPATIENT
Start: 2025-02-26 | End: 2025-02-28 | Stop reason: HOSPADM

## 2025-02-26 RX ORDER — SIMETHICONE 80 MG
1 TABLET,CHEWABLE ORAL EVERY 6 HOURS PRN
Status: DISCONTINUED | OUTPATIENT
Start: 2025-02-26 | End: 2025-02-28 | Stop reason: HOSPADM

## 2025-02-26 RX ORDER — DIPHENHYDRAMINE HYDROCHLORIDE 50 MG/ML
25 INJECTION INTRAMUSCULAR; INTRAVENOUS EVERY 4 HOURS PRN
Status: DISCONTINUED | OUTPATIENT
Start: 2025-02-26 | End: 2025-02-28 | Stop reason: HOSPADM

## 2025-02-26 RX ORDER — SIMETHICONE 80 MG
1 TABLET,CHEWABLE ORAL 4 TIMES DAILY PRN
Status: DISCONTINUED | OUTPATIENT
Start: 2025-02-26 | End: 2025-02-26

## 2025-02-26 RX ORDER — OXYTOCIN-SODIUM CHLORIDE 0.9% IV SOLN 30 UNIT/500ML 30-0.9/5 UT/ML-%
95 SOLUTION INTRAVENOUS CONTINUOUS PRN
Status: DISCONTINUED | OUTPATIENT
Start: 2025-02-26 | End: 2025-02-26

## 2025-02-26 RX ORDER — ACETAMINOPHEN 325 MG/1
650 TABLET ORAL EVERY 6 HOURS PRN
Status: DISCONTINUED | OUTPATIENT
Start: 2025-02-26 | End: 2025-02-27

## 2025-02-26 RX ORDER — OXYTOCIN-SODIUM CHLORIDE 0.9% IV SOLN 30 UNIT/500ML 30-0.9/5 UT/ML-%
SOLUTION INTRAVENOUS
Status: DISPENSED
Start: 2025-02-26 | End: 2025-02-27

## 2025-02-26 RX ORDER — OXYTOCIN 10 [USP'U]/ML
10 INJECTION, SOLUTION INTRAMUSCULAR; INTRAVENOUS ONCE AS NEEDED
Status: DISCONTINUED | OUTPATIENT
Start: 2025-02-26 | End: 2025-02-26

## 2025-02-26 RX ORDER — EPHEDRINE SULFATE 50 MG/ML
10 INJECTION, SOLUTION INTRAVENOUS ONCE AS NEEDED
Status: DISCONTINUED | OUTPATIENT
Start: 2025-02-26 | End: 2025-02-26

## 2025-02-26 RX ORDER — OXYTOCIN-SODIUM CHLORIDE 0.9% IV SOLN 30 UNIT/500ML 30-0.9/5 UT/ML-%
95 SOLUTION INTRAVENOUS ONCE AS NEEDED
Status: DISCONTINUED | OUTPATIENT
Start: 2025-02-26 | End: 2025-02-26

## 2025-02-26 RX ORDER — OXYTOCIN 10 [USP'U]/ML
10 INJECTION, SOLUTION INTRAMUSCULAR; INTRAVENOUS ONCE AS NEEDED
Status: DISCONTINUED | OUTPATIENT
Start: 2025-02-26 | End: 2025-02-28 | Stop reason: HOSPADM

## 2025-02-26 RX ORDER — PRENATAL WITH FERROUS FUM AND FOLIC ACID 3080; 920; 120; 400; 22; 1.84; 3; 20; 10; 1; 12; 200; 27; 25; 2 [IU]/1; [IU]/1; MG/1; [IU]/1; MG/1; MG/1; MG/1; MG/1; MG/1; MG/1; UG/1; MG/1; MG/1; MG/1; MG/1
1 TABLET ORAL DAILY
Status: DISCONTINUED | OUTPATIENT
Start: 2025-02-27 | End: 2025-02-28 | Stop reason: HOSPADM

## 2025-02-26 RX ORDER — ACETAMINOPHEN 325 MG/1
650 TABLET ORAL EVERY 6 HOURS PRN
Status: DISCONTINUED | OUTPATIENT
Start: 2025-02-26 | End: 2025-02-28 | Stop reason: HOSPADM

## 2025-02-26 RX ORDER — BUTORPHANOL TARTRATE 2 MG/ML
1 INJECTION INTRAMUSCULAR; INTRAVENOUS
Status: DISCONTINUED | OUTPATIENT
Start: 2025-02-26 | End: 2025-02-27

## 2025-02-26 RX ORDER — PROMETHAZINE HYDROCHLORIDE 25 MG/1
25 TABLET ORAL EVERY 6 HOURS PRN
Status: DISCONTINUED | OUTPATIENT
Start: 2025-02-26 | End: 2025-02-27

## 2025-02-26 RX ORDER — OXYTOCIN-SODIUM CHLORIDE 0.9% IV SOLN 30 UNIT/500ML 30-0.9/5 UT/ML-%
0-30 SOLUTION INTRAVENOUS CONTINUOUS
Status: DISCONTINUED | OUTPATIENT
Start: 2025-02-26 | End: 2025-02-26

## 2025-02-26 RX ORDER — OXYCODONE AND ACETAMINOPHEN 5; 325 MG/1; MG/1
1 TABLET ORAL EVERY 4 HOURS PRN
Status: DISCONTINUED | OUTPATIENT
Start: 2025-02-26 | End: 2025-02-28 | Stop reason: HOSPADM

## 2025-02-26 RX ORDER — CARBOPROST TROMETHAMINE 250 UG/ML
250 INJECTION, SOLUTION INTRAMUSCULAR
Status: DISCONTINUED | OUTPATIENT
Start: 2025-02-26 | End: 2025-02-28 | Stop reason: HOSPADM

## 2025-02-26 RX ORDER — CEFAZOLIN SODIUM 1 G/3ML
2 INJECTION, POWDER, FOR SOLUTION INTRAMUSCULAR; INTRAVENOUS ONCE AS NEEDED
Status: DISCONTINUED | OUTPATIENT
Start: 2025-02-26 | End: 2025-02-26

## 2025-02-26 RX ORDER — ONDANSETRON 4 MG/1
8 TABLET, ORALLY DISINTEGRATING ORAL EVERY 8 HOURS PRN
Status: DISCONTINUED | OUTPATIENT
Start: 2025-02-26 | End: 2025-02-27

## 2025-02-26 RX ORDER — SODIUM CHLORIDE 9 MG/ML
INJECTION, SOLUTION INTRAVENOUS
Status: DISCONTINUED | OUTPATIENT
Start: 2025-02-26 | End: 2025-02-27

## 2025-02-26 RX ORDER — OXYCODONE AND ACETAMINOPHEN 10; 325 MG/1; MG/1
1 TABLET ORAL EVERY 4 HOURS PRN
Status: DISCONTINUED | OUTPATIENT
Start: 2025-02-26 | End: 2025-02-28 | Stop reason: HOSPADM

## 2025-02-26 RX ORDER — SODIUM CHLORIDE 0.9 % (FLUSH) 0.9 %
10 SYRINGE (ML) INJECTION
Status: DISCONTINUED | OUTPATIENT
Start: 2025-02-26 | End: 2025-02-28 | Stop reason: HOSPADM

## 2025-02-26 RX ORDER — OXYTOCIN-SODIUM CHLORIDE 0.9% IV SOLN 30 UNIT/500ML 30-0.9/5 UT/ML-%
30 SOLUTION INTRAVENOUS ONCE AS NEEDED
Status: DISCONTINUED | OUTPATIENT
Start: 2025-02-26 | End: 2025-02-26

## 2025-02-26 RX ORDER — BUPIVACAINE HYDROCHLORIDE 5 MG/ML
10 INJECTION, SOLUTION EPIDURAL; INTRACAUDAL ONCE
Status: COMPLETED | OUTPATIENT
Start: 2025-02-26 | End: 2025-02-26

## 2025-02-26 RX ORDER — DIPHENOXYLATE HYDROCHLORIDE AND ATROPINE SULFATE 2.5; .025 MG/1; MG/1
2 TABLET ORAL EVERY 6 HOURS PRN
Status: DISCONTINUED | OUTPATIENT
Start: 2025-02-26 | End: 2025-02-26 | Stop reason: SDUPTHER

## 2025-02-26 RX ORDER — BUPIVACAINE HYDROCHLORIDE 2.5 MG/ML
INJECTION, SOLUTION EPIDURAL; INFILTRATION; INTRACAUDAL
Status: DISCONTINUED | OUTPATIENT
Start: 2025-02-26 | End: 2025-02-26

## 2025-02-26 RX ORDER — MUPIROCIN 20 MG/G
OINTMENT TOPICAL
Status: DISCONTINUED | OUTPATIENT
Start: 2025-02-26 | End: 2025-02-27

## 2025-02-26 RX ORDER — OXYTOCIN-SODIUM CHLORIDE 0.9% IV SOLN 30 UNIT/500ML 30-0.9/5 UT/ML-%
95 SOLUTION INTRAVENOUS ONCE AS NEEDED
OUTPATIENT
Start: 2025-02-26 | End: 2036-07-25

## 2025-02-26 RX ORDER — PENICILLIN G 3000000 [IU]/50ML
3 INJECTION, SOLUTION INTRAVENOUS
Status: DISCONTINUED | OUTPATIENT
Start: 2025-02-26 | End: 2025-02-26

## 2025-02-26 RX ORDER — OXYTOCIN-SODIUM CHLORIDE 0.9% IV SOLN 30 UNIT/500ML 30-0.9/5 UT/ML-%
95 SOLUTION INTRAVENOUS CONTINUOUS PRN
Status: DISCONTINUED | OUTPATIENT
Start: 2025-02-26 | End: 2025-02-26 | Stop reason: SDUPTHER

## 2025-02-26 RX ORDER — METHYLERGONOVINE MALEATE 0.2 MG/ML
200 INJECTION INTRAVENOUS ONCE AS NEEDED
Status: DISCONTINUED | OUTPATIENT
Start: 2025-02-26 | End: 2025-02-26 | Stop reason: SDUPTHER

## 2025-02-26 RX ORDER — CARBOPROST TROMETHAMINE 250 UG/ML
250 INJECTION, SOLUTION INTRAMUSCULAR
Status: DISCONTINUED | OUTPATIENT
Start: 2025-02-26 | End: 2025-02-26 | Stop reason: SDUPTHER

## 2025-02-26 RX ORDER — TERBUTALINE SULFATE 1 MG/ML
0.25 INJECTION SUBCUTANEOUS
Status: DISCONTINUED | OUTPATIENT
Start: 2025-02-26 | End: 2025-02-27

## 2025-02-26 RX ORDER — MISOPROSTOL 100 UG/1
800 TABLET ORAL ONCE AS NEEDED
Status: DISCONTINUED | OUTPATIENT
Start: 2025-02-26 | End: 2025-02-28 | Stop reason: HOSPADM

## 2025-02-26 RX ORDER — IBUPROFEN 600 MG/1
600 TABLET ORAL EVERY 6 HOURS PRN
Status: DISCONTINUED | OUTPATIENT
Start: 2025-02-26 | End: 2025-02-28 | Stop reason: HOSPADM

## 2025-02-26 RX ORDER — OXYTOCIN-SODIUM CHLORIDE 0.9% IV SOLN 30 UNIT/500ML 30-0.9/5 UT/ML-%
10 SOLUTION INTRAVENOUS ONCE AS NEEDED
Status: DISCONTINUED | OUTPATIENT
Start: 2025-02-26 | End: 2025-02-28 | Stop reason: HOSPADM

## 2025-02-26 RX ORDER — DIPHENOXYLATE HYDROCHLORIDE AND ATROPINE SULFATE 2.5; .025 MG/1; MG/1
2 TABLET ORAL EVERY 6 HOURS PRN
Status: DISCONTINUED | OUTPATIENT
Start: 2025-02-26 | End: 2025-02-28 | Stop reason: HOSPADM

## 2025-02-26 RX ORDER — ONDANSETRON 4 MG/1
8 TABLET, ORALLY DISINTEGRATING ORAL EVERY 8 HOURS PRN
Status: DISCONTINUED | OUTPATIENT
Start: 2025-02-26 | End: 2025-02-28 | Stop reason: HOSPADM

## 2025-02-26 RX ORDER — MISOPROSTOL 100 UG/1
800 TABLET ORAL ONCE AS NEEDED
Status: DISCONTINUED | OUTPATIENT
Start: 2025-02-26 | End: 2025-02-26

## 2025-02-26 RX ORDER — METHYLERGONOVINE MALEATE 0.2 MG/ML
200 INJECTION INTRAVENOUS ONCE AS NEEDED
Status: DISCONTINUED | OUTPATIENT
Start: 2025-02-26 | End: 2025-02-28 | Stop reason: HOSPADM

## 2025-02-26 RX ORDER — CALCIUM CARBONATE 200(500)MG
500 TABLET,CHEWABLE ORAL 3 TIMES DAILY PRN
Status: DISCONTINUED | OUTPATIENT
Start: 2025-02-26 | End: 2025-02-27

## 2025-02-26 RX ORDER — CEFAZOLIN SODIUM 2 G/50ML
2 SOLUTION INTRAVENOUS ONCE
Status: DISCONTINUED | OUTPATIENT
Start: 2025-02-26 | End: 2025-02-26

## 2025-02-26 RX ORDER — LIDOCAINE HYDROCHLORIDE AND EPINEPHRINE 15; 5 MG/ML; UG/ML
INJECTION, SOLUTION EPIDURAL
Status: DISCONTINUED | OUTPATIENT
Start: 2025-02-26 | End: 2025-02-26

## 2025-02-26 RX ADMIN — Medication 2 MILLI-UNITS/MIN: at 04:02

## 2025-02-26 RX ADMIN — PENICILLIN G 3 MILLION UNITS: 3000000 INJECTION, SOLUTION INTRAVENOUS at 09:02

## 2025-02-26 RX ADMIN — BUPIVACAINE HYDROCHLORIDE 5 ML: 2.5 INJECTION, SOLUTION EPIDURAL; INFILTRATION; INTRACAUDAL; PERINEURAL at 11:02

## 2025-02-26 RX ADMIN — IBUPROFEN 600 MG: 600 TABLET, FILM COATED ORAL at 06:02

## 2025-02-26 RX ADMIN — SODIUM CHLORIDE, POTASSIUM CHLORIDE, SODIUM LACTATE AND CALCIUM CHLORIDE: 600; 310; 30; 20 INJECTION, SOLUTION INTRAVENOUS at 04:02

## 2025-02-26 RX ADMIN — LIDOCAINE HYDROCHLORIDE,EPINEPHRINE BITARTRATE 5 ML: 15; .005 INJECTION, SOLUTION EPIDURAL; INFILTRATION; INTRACAUDAL; PERINEURAL at 10:02

## 2025-02-26 RX ADMIN — PENICILLIN G 3 MILLION UNITS: 3000000 INJECTION, SOLUTION INTRAVENOUS at 02:02

## 2025-02-26 RX ADMIN — ACETAMINOPHEN 650 MG: 325 TABLET, FILM COATED ORAL at 08:02

## 2025-02-26 RX ADMIN — MISOPROSTOL 25 MCG: 100 TABLET ORAL at 12:02

## 2025-02-26 RX ADMIN — ACETAMINOPHEN 650 MG: 325 TABLET, FILM COATED ORAL at 01:02

## 2025-02-26 RX ADMIN — Medication 10 ML/HR: at 10:02

## 2025-02-26 RX ADMIN — BUPIVACAINE HYDROCHLORIDE 10 ML: 5 INJECTION, SOLUTION EPIDURAL; INTRACAUDAL; PERINEURAL at 04:02

## 2025-02-26 NOTE — ANESTHESIA POSTPROCEDURE EVALUATION
Anesthesia Post Evaluation    Patient: Barby Thurman    Procedure(s) Performed: * No procedures listed *    Final Anesthesia Type: epidural      Patient location during evaluation: labor & delivery  Patient participation: Yes- Able to Participate  Level of consciousness: awake and alert  Post-procedure vital signs: reviewed and stable  Pain management: adequate  Airway patency: patent  HARSHAD mitigation strategies: Multimodal analgesia  PONV status at discharge: No PONV  Anesthetic complications: no      Cardiovascular status: blood pressure returned to baseline and hemodynamically stable  Respiratory status: unassisted and spontaneous ventilation  Hydration status: euvolemic  Follow-up not needed.          Vitals Value Taken Time   /76 02/26/25 16:51   Temp 37.1 °C (98.8 °F) 02/26/25 16:51   Pulse 141 02/26/25 16:51   Resp 18 02/26/25 04:40   SpO2 100 % 02/26/25 16:38   Vitals shown include unfiled device data.      No case tracking events are documented in the log.      Pain/Tere Score: Pain Rating Prior to Med Admin: 6 (2/26/2025  1:01 AM)

## 2025-02-26 NOTE — PROGRESS NOTES
C/O pelvic pressure.    Temp:  [97.7 °F (36.5 °C)-99.1 °F (37.3 °C)] 98.4 °F (36.9 °C)  Pulse:  [] 130  Resp:  [18] 18  SpO2:  [94 %-100 %] 100 %  BP: ()/(50-92) 119/71       Intake/Output Summary (Last 24 hours) at 2025 1536  Last data filed at 2025 1323  Gross per 24 hour   Intake 3763.6 ml   Output --   Net 3763.6 ml        Cx- 8/100/0    Ctx q 2 min  FHR Cat I  Pitocin 12 mu/min    IUP 36w3d  Gest HTN, FGR    Anticipate

## 2025-02-26 NOTE — ANESTHESIA PREPROCEDURE EVALUATION
02/26/2025  Barby Thurman is a 19 y.o., female.      Pre-op Assessment    I have reviewed the Patient Summary Reports.     I have reviewed the Nursing Notes.    I have reviewed the Medications.     Review of Systems  Anesthesia Hx:               Denies Personal Hx of Anesthesia complications.                    Cardiovascular:     Hypertension                                    Hypertension             Physical Exam  General: Well nourished, Cooperative and Alert    Airway:  Mallampati: II   Mouth Opening: Normal  TM Distance: Normal  Tongue: Normal    Dental:  Intact    Chest/Lungs:  Normal Respiratory Rate    Heart:  Rate: Normal        Anesthesia Plan  Type of Anesthesia, risks & benefits discussed:    Anesthesia Type: Epidural  Intra-op Monitoring Plan: Standard ASA Monitors  Post Op Pain Control Plan: epidural analgesia  Induction:  IV  Informed Consent: Informed consent signed with the Patient and all parties understand the risks and agree with anesthesia plan.  All questions answered.   ASA Score: 2  Day of Surgery Review of History & Physical: H&P Update referred to the surgeon/provider.    Ready For Surgery From Anesthesia Perspective.     .

## 2025-02-26 NOTE — OP NOTE
OPERATIVE REPORT    Date of Procedure: 2025    Patient Name: Barby Thurman    MRN: [id number]    Surgeon: Cheko Villalta MD    Assistant: Dr Shahana Martinez    Pre-Operative Diagnosis:  Gestational hypertension   Fetal growth restriction   36 weeks   Post-Operative Diagnosis:  Rajendra   Delivered  Anesthesia: Epidural    Procedure:       Complications: No    Estimated Blood Loss (EBL): 200           Findings: Live Female    Specimens: Cord Blood    Description:  Patient was prepped and draped in a normal sterile fashion. The 2nd stage of labor was within normal limits. The infant's head delivered in ANGELITA without any difficulties along with the shoulders and the rest of the infant. Body Nuchal cords and Clear fluid noted. Mouth and nose were suctioned on the abdomen. Cord was doubly clamped and cut. The infant was handed to the waiting staff. Cord blood taken. The placenta was extracted spontaneously. There was No lacerations on the perineum and No laceration on the labia  . Estimated blood loss was 200 patient tolerated the procedure well and instrument and sponge count were correct patient was to recover in labor room for approximately 1 hour.

## 2025-02-26 NOTE — NURSING
Dr. Mas orders to hold 20:00 cytotec and resume at midnight discussed with pt and family. Explained to pt that she may eat and shower. Efm and toco removed. Pt prepared for shower. Family member remains with pt to assist if needed.

## 2025-02-26 NOTE — NURSING
Pt out of shower. Request to be left off of cont. Monitoring until after she eats. Denies pain and states positive FM.

## 2025-02-26 NOTE — PROGRESS NOTES
LABOR PROGRESS NOTE:     19 y.o.  @ 36w3d undergoing IOL for gestational hypertension and FGR. She is s/p 3 doses of misoprostol and is on oxytocin.  Pain is tolerable.     Temp:  [97.7 °F (36.5 °C)-99.1 °F (37.3 °C)] 97.7 °F (36.5 °C)  Pulse:  [] 105  Resp:  [16-18] 18  SpO2:  [98 %-100 %] 100 %  BP: ()/(56-96) 122/76    FHT:  140   Variability: moderate   Accelerations:  Present   Decelerations:  Absent   Category:  I   Contractions:  Poorly monitored       SVE: /-1       Membranes:  AROM - clear   Augmentation: Pitocin @ 8   GBS Status:  Unknown (PCR negative + ) - prophylaxis is indicated   Pain: Epidural available upon request        Dispo: Anticipate      Walter Fang MD

## 2025-02-26 NOTE — ANESTHESIA PROCEDURE NOTES
Epidural    Patient location during procedure: OB   Reason for block: primary anesthetic   Reason for block: labor analgesia requested by patient and obstetrician  Diagnosis: IUP   Start time: 2/26/2025 10:45 AM  Timeout: 2/26/2025 10:45 AM  End time: 2/26/2025 11:00 AM    Staffing  Performing Provider: Raheel Gupta MD  Authorizing Provider: Raheel Gupta MD    Staffing  Performed by: Raheel Gputa MD  Authorized by: Raheel Gupta MD        Preanesthetic Checklist  Completed: patient identified, IV checked, site marked, risks and benefits discussed, surgical consent, monitors and equipment checked, pre-op evaluation, timeout performed, anesthesia consent given, hand hygiene performed and patient being monitored  Preparation  Patient position: sitting  Prep: ChloraPrep  Patient monitoring: Pulse Ox  Reason for block: primary anesthetic   Epidural  Skin Anesthetic: lidocaine 1%  Administration type: continuous  Approach: midline  Interspace: L3-4    Injection technique: ALFREDA saline  Needle and Epidural Catheter  Needle type: Tuohy   Needle gauge: 17  Needle insertion depth: 6 cm  Catheter type: springwound  Catheter size: 19 G  Catheter at skin depth: 11 cm  Insertion Attempts: 1  Test dose: 5 mL of lidocaine 1.5% with Epi 1-to-200,000  Additional Documentation: no paresthesia on injection, no significant pain on injection, no signs/symptoms of IV or SA injection, no significant complaints from patient and negative aspiration for heme and CSF  Needle localization: anatomical landmarks  Assessment  Ease of block: easy  Patient's tolerance of the procedure: comfortable throughout block  Additional Notes  Straightforward epidural, crisp ALFREDA, easy threading of catheter, negative SA/IV test dose    Alonso URBANO      No inadvertent dural puncture with Tuohy.  Dural puncture not performed with spinal needle

## 2025-02-27 LAB
ANTIBODY IDENTIFICATION: NORMAL
BASOPHILS # BLD AUTO: 0.03 X10(3)/MCL
BASOPHILS NFR BLD AUTO: 0.3 %
EOSINOPHIL # BLD AUTO: 0.07 X10(3)/MCL (ref 0–0.9)
EOSINOPHIL NFR BLD AUTO: 0.7 %
ERYTHROCYTE [DISTWIDTH] IN BLOOD BY AUTOMATED COUNT: 14.6 % (ref 11.5–17)
GROUP & RH: ABNORMAL
HBV SURFACE AG SERPL QL IA: NONREACTIVE
HCT VFR BLD AUTO: 33.3 % (ref 37–47)
HGB BLD-MCNC: 10.8 G/DL (ref 12–16)
IMM GRANULOCYTES # BLD AUTO: 0.04 X10(3)/MCL (ref 0–0.04)
IMM GRANULOCYTES NFR BLD AUTO: 0.4 %
INDIRECT COOMBS: ABNORMAL
LYMPHOCYTES # BLD AUTO: 1.04 X10(3)/MCL (ref 0.6–4.6)
LYMPHOCYTES NFR BLD AUTO: 9.9 %
MCH RBC QN AUTO: 29.3 PG (ref 27–31)
MCHC RBC AUTO-ENTMCNC: 32.4 G/DL (ref 33–36)
MCV RBC AUTO: 90.5 FL (ref 80–94)
MONOCYTES # BLD AUTO: 0.81 X10(3)/MCL (ref 0.1–1.3)
MONOCYTES NFR BLD AUTO: 7.7 %
NEUTROPHILS # BLD AUTO: 8.49 X10(3)/MCL (ref 2.1–9.2)
NEUTROPHILS NFR BLD AUTO: 81 %
NRBC BLD AUTO-RTO: 0 %
PLATELET # BLD AUTO: 211 X10(3)/MCL (ref 130–400)
PMV BLD AUTO: 10.2 FL (ref 7.4–10.4)
RBC # BLD AUTO: 3.68 X10(6)/MCL (ref 4.2–5.4)
SPECIMEN OUTDATE: ABNORMAL
WBC # BLD AUTO: 10.48 X10(3)/MCL (ref 4.5–11.5)

## 2025-02-27 PROCEDURE — 11000001 HC ACUTE MED/SURG PRIVATE ROOM

## 2025-02-27 PROCEDURE — 36415 COLL VENOUS BLD VENIPUNCTURE: CPT | Performed by: OBSTETRICS & GYNECOLOGY

## 2025-02-27 PROCEDURE — 25000003 PHARM REV CODE 250

## 2025-02-27 PROCEDURE — 63600519 RHOGAM PHARM REV CODE 636 ALT 250 W HCPCS: Performed by: OBSTETRICS & GYNECOLOGY

## 2025-02-27 PROCEDURE — 85025 COMPLETE CBC W/AUTO DIFF WBC: CPT

## 2025-02-27 RX ADMIN — PRENATAL VITAMINS-IRON FUMARATE 27 MG IRON-FOLIC ACID 0.8 MG TABLET 1 TABLET: at 08:02

## 2025-02-27 RX ADMIN — IBUPROFEN 600 MG: 600 TABLET, FILM COATED ORAL at 04:02

## 2025-02-27 RX ADMIN — ACETAMINOPHEN 650 MG: 325 TABLET, FILM COATED ORAL at 08:02

## 2025-02-27 RX ADMIN — ACETAMINOPHEN 650 MG: 325 TABLET, FILM COATED ORAL at 01:02

## 2025-02-27 RX ADMIN — ACETAMINOPHEN 650 MG: 325 TABLET, FILM COATED ORAL at 07:02

## 2025-02-27 RX ADMIN — IBUPROFEN 600 MG: 600 TABLET, FILM COATED ORAL at 10:02

## 2025-02-27 RX ADMIN — HUMAN RHO(D) IMMUNE GLOBULIN 300 MCG: 1500 SOLUTION INTRAMUSCULAR; INTRAVENOUS at 09:02

## 2025-02-27 NOTE — PROGRESS NOTES
Postpartum Progress Note    Barby Thurman is a 19 y.o.  s/p  currently Post-Partum day 1.      Nurse reports no acute events overnight. Patient reports mild abd pain. Patient denies any issues or complaints. Ambulating, voiding, and tolerating regular diet. Passing flatus. No BM. Lochia more than regular menses since she usually has light-bleeding during menses. No subjective fever or chills. Pain well controlled with pain medications.  No HA, vision changes, dizziness, N/V, CP, SOB, breast pain or lower extremity pain.  Currently bottle feeding.  Desires birth control patch for contraception. Baby in room.      Physical Exam:   Vitals:    25 1848 25 1903 25 0035   BP: 130/75 125/76 127/81 121/80   Pulse: 103 97 84 84   Resp:       Temp:  99 °F (37.2 °C) 97.8 °F (36.6 °C) 97.7 °F (36.5 °C)   TempSrc:   Oral Axillary   SpO2:    99%       Gen: AAO x 3, NAD, resting in bed comfortably   CV: RRR, S1, S2, no murmurs  Resp: Non labored, lungs CTA bilaterally, good air movement  Abd: Fundus firm palpable below the umbilicus, abdomen soft and NT, + BSExt: No edema, calves non tender and equal in size, DP/Radial 2+   Psych: Cooperative, normal affect    Labs:  H/H: 11.3/34.2 to 10.8/33.3    Assessment/Plan  19 y.o. female   Status Post Vaginal delivery PPD#1.       Continue routine post-partum/operative care, continue to monitor  Patient does plan to Bottle feed  Continue PNV and Iron.  H/H stable and appropriate  Rubella Immune, Rh Positive, Varicella Immune  Up ad tonia; Pelvic Rest for 6 weeks.  DVT PPx - SCD   Contraception: Birth control patch  Dispo: Likely stable for discharge home on Postpartum day #2    Patient and Plan discussed with Dr. Leoncio Martinez MD  [facility], [facility], -  2025

## 2025-02-28 VITALS
HEART RATE: 77 BPM | SYSTOLIC BLOOD PRESSURE: 122 MMHG | TEMPERATURE: 98 F | DIASTOLIC BLOOD PRESSURE: 80 MMHG | RESPIRATION RATE: 18 BRPM | OXYGEN SATURATION: 100 %

## 2025-02-28 LAB
RUBV IGG SERPL IA-ACNC: 1.7
RUBV IGG SERPL QL IA: POSITIVE

## 2025-02-28 PROCEDURE — 25000003 PHARM REV CODE 250

## 2025-02-28 RX ORDER — DOCUSATE SODIUM 100 MG/1
200 CAPSULE, LIQUID FILLED ORAL 2 TIMES DAILY PRN
Qty: 20 CAPSULE | Refills: 0 | Status: SHIPPED | OUTPATIENT
Start: 2025-02-28

## 2025-02-28 RX ORDER — IBUPROFEN 600 MG/1
600 TABLET ORAL EVERY 6 HOURS PRN
Qty: 20 TABLET | Refills: 0 | Status: SHIPPED | OUTPATIENT
Start: 2025-02-28

## 2025-02-28 RX ORDER — ACETAMINOPHEN 325 MG/1
650 TABLET ORAL EVERY 6 HOURS PRN
Qty: 20 TABLET | Refills: 0 | Status: SHIPPED | OUTPATIENT
Start: 2025-02-28

## 2025-02-28 RX ORDER — OXYCODONE AND ACETAMINOPHEN 5; 325 MG/1; MG/1
1 TABLET ORAL EVERY 6 HOURS PRN
Qty: 20 TABLET | Refills: 0 | Status: CANCELLED | OUTPATIENT
Start: 2025-02-28

## 2025-02-28 RX ADMIN — IBUPROFEN 600 MG: 600 TABLET, FILM COATED ORAL at 11:02

## 2025-02-28 RX ADMIN — ACETAMINOPHEN 650 MG: 325 TABLET, FILM COATED ORAL at 02:02

## 2025-02-28 RX ADMIN — IBUPROFEN 600 MG: 600 TABLET, FILM COATED ORAL at 05:02

## 2025-02-28 NOTE — DISCHARGE SUMMARY
[facility] - 3rd Floor Mother/Baby Unit  Obstetrics  Discharge Summary      Patient Name: Barby Thurman  MRN: [id number]  Admission Date: 2025  Hospital Length of Stay: 3 days  Discharge Date and Time:  2025 5:37 AM  Attending Physician: Cheko Villalta MD   Discharging Provider: Donnell Martinez MD   Primary Care Provider: Scottie Kirkpatrick MD    HPI: No notes on file  This 19 y.o. female  at 36w1d transferred from [facility] due to elevated BP. She had 2 mild elevated BP at Dr. Law clinic in the morning, one mild elevated BP at [facility] and another mild elevated BP in ambulance en route. She known to [facility] and followed for FGR. She has been having some labile BP since 2/10. She had another couple sustained elevations on  and received course of steroids on  and .  She denies leaking of fluid, vaginal bleeding, contractions, or decreased fetal movement. She also denies headaches, visual disturbances, or epigastric pain.     Hospital Course:   [facility] plan IOL given history of gestational HTN and FGR at 36w2d. Patient transferred to L&D after suppository (patient request) and initiate misoprostol cervical ripening. PCN given for  status and unknown GBS status. Please see delivery note for further details. Her postpartum course was uncomplicated. On discharge day, patient's pain is controlled with oral pain medications. Pt is tolerating ambulation without SOB or CP, and regular diet without N/V. Reports lochia is mild. Denies any HA, vision changes, F/C, LE swelling. Denies any breast pain/soreness. Pt in stable condition and ready for discharge. She has been instructed to start and/or continue medications and follow up with her obstetrics provider as listed below.     Consults (From admission, onward)          Status Ordering Provider     Inpatient consult to Maternal Fetal Medicine  Once        Provider:  Randall Lutz MD    Completed SCOTTIE WHITE  "           Final Active Diagnoses:    Diagnosis Date Noted POA    PRINCIPAL PROBLEM:   (spontaneous vaginal delivery) [O80] 2025 Not Applicable    Gestational hypertension, third trimester [O13.3] 2025 Yes    Pregnancy affected by fetal growth restriction [O36.5990] 2025 Yes    Rh negative status during pregnancy, third trimester [O26.893, Z67.91] 10/01/2024 Yes      Problems Resolved During this Admission:    Diagnosis Date Noted Date Resolved POA    Elevated blood pressure affecting pregnancy in third trimester, antepartum [O16.3] 02/10/2025 2025 Yes        Feeding Method: bottle    Immunizations       Date Immunization Status Dose Route/Site Given by    25 MMR Incomplete 0.5 mL Subcutaneous/     25 Tdap Incomplete 0.5 mL Intramuscular/     25 0746 Rho (D) Immune Globulin - IM Deleted 300 mcg Intramuscular/     25 0958 Rho (D) Immune Globulin - IM Given 300 mcg Intravenous/ Keira Stewart RN            Delivery:    Episiotomy: None   Lacerations: None   Repair suture:     Repair # of packets:     Blood loss (ml):       Birth information:  YOB: 2027   Time of birth: 4:37 PM   Sex: female   Delivery type: Vaginal, Spontaneous   Gestational Age: 36w3d     Measurements    Weight: 2420 g  Weight (lbs): 5 lb 5.4 oz  Length: 47 cm  Length (in): 18.5"  Head circumference: 12.8 cm         Delivery Clinician: Delivery Providers    Delivering clinician: Cheko Villalta MD   Provider Role    Ani No RN Registered Nurse    Sybil Houston RN Registered Nurse    Sommer Saunders RN Registered Nurse    Gladys Avery RN Registered Nurse    Donnell Martinez MD [facility]    Opal Harkins MD \[facility] [place] [facility]\"" Family Med    Melissa Jones, FROYLAN Respiratory Therapist    Caryl Calloway, RRT Respiratory Therapist             Apgars    Living status: Living  Apgar Component Scores:  1 min.:  5 min.:  10 min.:  15 min.:  20 min.:    Skin " color:  0  1       Heart rate:  2  2       Reflex irritability:  2  2       Muscle tone:  2  2       Respiratory effort:  2  2       Total:  8  9       Apgars assigned by: ORIANA CARVAJAL RN         Placenta: Delivered:      Pending Diagnostic Studies:       Procedure Component Value Units Date/Time    Rubella antibody, IgG [[id number]] Collected: 02/26/25 2204    Order Status: Sent Lab Status: In process Updated: 02/26/25 2214    Specimen: Blood           Physical Exam  General: No acute distress  RESP: Clear to auscultation bilaterally, non labored  CV: Regular rate and rhythm, no murmurs, no edema  ABD: Non tender, BS+; Fundus firm below umbilicus  EXT: Bilateral lower extremity no edema or tenderness    Discharged Condition: good  Disposition: Home or Self Care  Follow Up:   Follow-up Information       Franklyn Law MD. Go on 3/3/2025.    Specialty: Obstetrics and Gynecology  Why: Go to office Monday 3/3 for blood pressure check.  Contact information:  [address] [place] [id number] [phone]               Franklyn Law MD. Schedule an appointment as soon as possible for a visit in 5 week(s).    Specialty: Obstetrics and Gynecology  Why: Call office as soon as possible to schedule an appointment to be seen within 5-6 weeks.  Contact information:  [address] [place] [id number] [phone]                           Patient Instructions:      Diet Adult Regular     Lifting restrictions   Order Comments: Avoid lifting anything heavier than your baby for 4-8 weeks and avoid  heavy exercises that strain your belly muscles.     Pelvic Rest   Order Comments: Avoid sexual activity, douching, and using tampons for about six weeks.     Notify your health care provider if you experience any of the following:  temperature >100.4     Notify your health care provider if you experience any of the following:  persistent nausea and vomiting or diarrhea     Notify your health care provider if you experience any of  the following:  severe uncontrolled pain     Notify your health care provider if you experience any of the following:  redness, tenderness, or signs of infection (pain, swelling, redness, odor or green/yellow discharge around incision site)     Notify your health care provider if you experience any of the following:  difficulty breathing or increased cough     Notify your health care provider if you experience any of the following:  severe persistent headache     Notify your health care provider if you experience any of the following:  worsening rash     Notify your health care provider if you experience any of the following:  persistent dizziness, light-headedness, or visual disturbances     Notify your health care provider if you experience any of the following:  increased confusion or weakness     Medications:  Current Discharge Medication List        START taking these medications    Details   acetaminophen (TYLENOL) 325 MG tablet Take 2 tablets (650 mg total) by mouth every 6 (six) hours as needed for Pain.  Qty: 20 tablet, Refills: 0      docusate sodium (COLACE) 100 MG capsule Take 2 capsules (200 mg total) by mouth 2 (two) times daily as needed for Constipation.  Qty: 20 capsule, Refills: 0      ibuprofen (ADVIL,MOTRIN) 600 MG tablet Take 1 tablet (600 mg total) by mouth every 6 (six) hours as needed (cramping).  Qty: 20 tablet, Refills: 0           CONTINUE these medications which have NOT CHANGED    Details   PNV no.819-[id number]-dha-epa-fish (PRENATAL GUMMIES) 400 mcg-35 mg- 25 mg-5 mg Chew Take by mouth.           Donnell Martinez MD  [facility], -1  02/28/2025

## 2025-03-10 ENCOUNTER — TELEPHONE (OUTPATIENT)
Dept: OBSTETRICS AND GYNECOLOGY | Facility: CLINIC | Age: 20
End: 2025-03-10
Payer: MEDICAID

## 2025-03-10 DIAGNOSIS — B37.9 YEAST INFECTION: Primary | ICD-10-CM

## 2025-03-10 RX ORDER — FLUCONAZOLE 100 MG/1
200 TABLET ORAL
Qty: 4 TABLET | Refills: 0 | Status: SHIPPED | OUTPATIENT
Start: 2025-03-10 | End: 2025-03-14

## 2025-03-10 NOTE — TELEPHONE ENCOUNTER
----- Message from Adelaida sent at 3/10/2025  1:59 PM CDT -----  Regarding: Patient case  .Type:  Needs Medical AdviceWho Called:  Gabby Call Back Number:  [phone] Information:  called to schedule postpartum after delivering at [facility]- set for 4/10, but has some questions she would not discuss over the phone- would like a call back when possible

## 2025-03-10 NOTE — TELEPHONE ENCOUNTER
" . Reports vaginal irritation, "raw", thick white vaginal discharge. Denies vaginal discharge, fever. unrelieved with OTC Monistat. Requesting medication to [place] pharmacy. Per SERINA, Diflucan 200mg q other day x4 doses.   "

## 2025-04-07 NOTE — PROGRESS NOTES
Subjective:       Barby Thurman is a 19 y.o. female  status post  (25) presents for her 6 week postpartum visit. No complaints. Infant doing well, bottle feeding.  Mood is stable. Denies SI/HI.  Denies pre-e ROS.  H/o GHTN.    Vitals:    04/10/25 1113 04/10/25 1125   BP: (!) 150/98 (!) 152/100         The patient's history was reviewed and updated.    Gyn History:    Menstrual History  Cycle: No (No cycle since delivery)  Menarche Age: 12 years  No Cycle Reason: Other    Menopause  Menopause Age: 1 years  Post Menopausal Bleeding: No  Hormone Replacement Therapy: No    Pap History  Last pap date: 08/15/24  Result: Normal  History of Abnormal Pap: No  HPV Vaccine Completed: No    Winn  Sexually Active: Yes  Sexual Orientation: heterosexual  Postcoital Bleeding: No  Dyspareunia: No  STI History: Yes  STI Type: Chlamydia  Contraception: No    Breast History  Last Breast Imaging Date: No  History of Breast Biopsy: No            Review of Systems  General/Constitutional: Chills denies. Fatigue/weakness denies. Fever denies . Night sweats denies . Hot flashes denies  Gastrointestinal: Abdominal pain denies. Blood in stool denies. Constipation denies. Diarrhea denies. Heartburn denies. Nausea denies. Vomiting denies   Genitourinary: Incontinence denies. Blood in urine denies. Frequent urination denies.  Urgency denies. Painful urination denies. Nocturia denies   Gynecologic: Irregular menses denies.  Heavy bleeding  denies.  Painful menses denies.  Vaginal discharge denies. Vaginal odor denies. Vaginal itching/Irritation denies. Vaginal lesion denies.  Pelvic pain denies. Decreased libido denies. Vulvar lesion denies. Prolapse of genital organs denies. Painful intercourse denies. Postcoital bleeding denies   Psychiatric: Mood lability denies.  Depressed mood denies. Suicidal thoughts denies. Anxiety denies.  Overwhelmed denies.  Appetite normal. Energy level normal       Physical Exam:   BP (!)  152/100 (BP Location: Left arm, Patient Position: Sitting)   Wt 65.3 kg (144 lb)   LMP 06/16/2024 (Exact Date)   Breastfeeding No   BMI 24.72 kg/m²      Chaperone present.     Constitutional: General appearance: healthy, well-nourished and well-developed   Psychiatric: Orientation to time, place and person. Normal mood and affect and        active, alert   Breast: Inspection/palpation: no tenderness, masses, skin changes or abnormal secretions   Abdomen:  Auscultation/Inspection/Palpation: deferred   Female Genitalia:      Vulva: no masses, atrophy or lesions      Bladder/Urethra: no urethral discharge or mass, normal meatus, bladder                 non-distended.      Vagina: no tenderness, erythema, cystocele, rectocele, abnormal                vaginal discharge, or vesicle(s) or ulcers                   Cervix: no discharge or cervical motion tenderness and grossly normal      Uterus: normal size and shape and midline, non-tender, and no uterine                  prolapse.      Adnexa/Parametria: no parametrial tenderness or mass, no adnexal tenderness                 or ovarian mass.     Assessment:     1. Postpartum exam    2. Encounter for contraceptive management, unspecified type  -     POCT urine pregnancy  -     norelgestromin-ethinyl estradiol 150-35 mcg/24 hr; Place 1 patch onto the skin every 7 days.  Dispense: 4 patch; Refill: 11    3. Postpartum hypertension  -     hydroCHLOROthiazide 12.5 MG Tab; Take 1 tablet (12.5 mg total) by mouth once daily.  Dispense: 30 tablet; Refill: 11         Plan:   May return to normal activities  Healthy diet and exercise  desires contraception at this time    Discussed with patient contraceptive options including natural family planning, barrier, oral contraceptives, patch, NuvaRing, Depo-Provera, Nexplanon, IUDs, abstinence.     Safe sex education given. Discussed with patient that birth control options discussed above do not protect against STDs.   Patient  desires: Xulane Patch    Discussed high blood pressure  Rx HCTZ 12.5MG  Strict BP monitoring, precautions     RTC 2 WEEKS BP F/U     This note was transcribed by Adela Hoffman. There may be transcription errors as a result, however minimal. Effort has been made to ensure accuracy of transcription, but any obvious errors or omissions should be clarified with the author of the document.       I agree with the above documentation.

## 2025-04-10 ENCOUNTER — POSTPARTUM VISIT (OUTPATIENT)
Dept: OBSTETRICS AND GYNECOLOGY | Facility: CLINIC | Age: 20
End: 2025-04-10
Payer: MEDICAID

## 2025-04-10 VITALS — WEIGHT: 144 LBS | DIASTOLIC BLOOD PRESSURE: 100 MMHG | BODY MASS INDEX: 24.72 KG/M2 | SYSTOLIC BLOOD PRESSURE: 152 MMHG

## 2025-04-10 DIAGNOSIS — Z30.9 ENCOUNTER FOR CONTRACEPTIVE MANAGEMENT, UNSPECIFIED TYPE: ICD-10-CM

## 2025-04-10 PROBLEM — O36.5990 PREGNANCY AFFECTED BY FETAL GROWTH RESTRICTION: Status: RESOLVED | Noted: 2025-01-08 | Resolved: 2025-04-10

## 2025-04-10 PROBLEM — Z67.91 RH NEGATIVE STATUS DURING PREGNANCY, THIRD TRIMESTER: Status: RESOLVED | Noted: 2024-10-01 | Resolved: 2025-04-10

## 2025-04-10 PROBLEM — O26.893 RH NEGATIVE STATUS DURING PREGNANCY, THIRD TRIMESTER: Status: RESOLVED | Noted: 2024-10-01 | Resolved: 2025-04-10

## 2025-04-10 PROBLEM — O13.3 GESTATIONAL HYPERTENSION, THIRD TRIMESTER: Status: RESOLVED | Noted: 2025-02-17 | Resolved: 2025-04-10

## 2025-04-10 LAB
B-HCG UR QL: NEGATIVE
CTP QC/QA: YES

## 2025-04-10 RX ORDER — HYDROCHLOROTHIAZIDE 12.5 MG/1
12.5 TABLET ORAL DAILY
Qty: 30 TABLET | Refills: 11 | Status: SHIPPED | OUTPATIENT
Start: 2025-04-10 | End: 2026-04-10

## 2025-04-10 RX ORDER — NORELGESTROMIN AND ETHINYL ESTRADIOL 35; 150 UG/MG; UG/MG
1 PATCH TRANSDERMAL
Qty: 4 PATCH | Refills: 11 | Status: SHIPPED | OUTPATIENT
Start: 2025-04-10 | End: 2026-04-10

## 2025-04-24 ENCOUNTER — TELEPHONE (OUTPATIENT)
Dept: OBSTETRICS AND GYNECOLOGY | Facility: CLINIC | Age: 20
End: 2025-04-24

## 2025-04-24 NOTE — TELEPHONE ENCOUNTER
Spoke with Dr Estes, may follow up OCP care with [facility]. To keep f/u appts with PCP and continued BP medication as directed. Strict precautions.

## 2025-04-24 NOTE — TELEPHONE ENCOUNTER
----- Message from Adelaida sent at 4/24/2025  1:47 PM CDT -----  Regarding: Patient case  .Type:  Needs Medical AdviceWho Called:  Gabby Call Back Number:  [phone] Information:  left a message with the answering service to cancel appt for this afternoon due to weather- states she also wanted to go back to  for her birth control but Dr Estes prescribed it for her at her postpartum appt on 4/10; also she states she saw her PCP for her BP and stated she was fine